# Patient Record
Sex: FEMALE | Race: WHITE | Employment: FULL TIME
[De-identification: names, ages, dates, MRNs, and addresses within clinical notes are randomized per-mention and may not be internally consistent; named-entity substitution may affect disease eponyms.]

---

## 2017-01-03 ENCOUNTER — SURGERY (OUTPATIENT)
Age: 47
End: 2017-01-03

## 2017-01-03 ENCOUNTER — ANESTHESIA EVENT (OUTPATIENT)
Dept: SURGERY | Facility: HOSPITAL | Age: 47
End: 2017-01-03
Payer: COMMERCIAL

## 2017-01-03 ENCOUNTER — ANESTHESIA (OUTPATIENT)
Dept: SURGERY | Facility: HOSPITAL | Age: 47
End: 2017-01-03
Payer: COMMERCIAL

## 2017-01-03 ENCOUNTER — HOSPITAL ENCOUNTER (OUTPATIENT)
Facility: HOSPITAL | Age: 47
Setting detail: OBSERVATION
Discharge: HOME | End: 2017-01-04
Attending: SURGERY | Admitting: SURGERY
Payer: COMMERCIAL

## 2017-01-03 DIAGNOSIS — K35.30 ACUTE APPENDICITIS WITH LOCALIZED PERITONITIS: Primary | ICD-10-CM

## 2017-01-03 PROBLEM — K37 APPENDICITIS: Status: ACTIVE | Noted: 2017-01-03

## 2017-01-03 PROCEDURE — 88304 TISSUE EXAM BY PATHOLOGIST: CPT | Performed by: SURGERY

## 2017-01-03 PROCEDURE — 0DTJ4ZZ RESECTION OF APPENDIX, PERCUTANEOUS ENDOSCOPIC APPROACH: ICD-10-PCS | Performed by: SURGERY

## 2017-01-03 RX ORDER — MIDAZOLAM HYDROCHLORIDE 1 MG/ML
1 INJECTION INTRAMUSCULAR; INTRAVENOUS EVERY 5 MIN PRN
Status: DISCONTINUED | OUTPATIENT
Start: 2017-01-03 | End: 2017-01-03 | Stop reason: HOSPADM

## 2017-01-03 RX ORDER — MELATONIN
325
Status: DISCONTINUED | OUTPATIENT
Start: 2017-01-04 | End: 2017-01-04

## 2017-01-03 RX ORDER — ONDANSETRON 2 MG/ML
INJECTION INTRAMUSCULAR; INTRAVENOUS
Status: DISCONTINUED | OUTPATIENT
Start: 2017-01-03 | End: 2017-01-03 | Stop reason: HOSPADM

## 2017-01-03 RX ORDER — HYDROCODONE BITARTRATE AND ACETAMINOPHEN 5; 325 MG/1; MG/1
1 TABLET ORAL AS NEEDED
Status: DISCONTINUED | OUTPATIENT
Start: 2017-01-03 | End: 2017-01-03 | Stop reason: HOSPADM

## 2017-01-03 RX ORDER — LIDOCAINE HYDROCHLORIDE AND EPINEPHRINE 10; 10 MG/ML; UG/ML
INJECTION, SOLUTION INFILTRATION; PERINEURAL AS NEEDED
Status: DISCONTINUED | OUTPATIENT
Start: 2017-01-03 | End: 2017-01-03 | Stop reason: HOSPADM

## 2017-01-03 RX ORDER — HYDROCODONE BITARTRATE AND ACETAMINOPHEN 5; 325 MG/1; MG/1
1 TABLET ORAL EVERY 4 HOURS PRN
Status: DISCONTINUED | OUTPATIENT
Start: 2017-01-03 | End: 2017-01-04

## 2017-01-03 RX ORDER — SODIUM CHLORIDE, SODIUM LACTATE, POTASSIUM CHLORIDE, CALCIUM CHLORIDE 600; 310; 30; 20 MG/100ML; MG/100ML; MG/100ML; MG/100ML
INJECTION, SOLUTION INTRAVENOUS CONTINUOUS
Status: DISCONTINUED | OUTPATIENT
Start: 2017-01-03 | End: 2017-01-04

## 2017-01-03 RX ORDER — HYDROCODONE BITARTRATE AND ACETAMINOPHEN 5; 325 MG/1; MG/1
2 TABLET ORAL EVERY 4 HOURS PRN
Status: DISCONTINUED | OUTPATIENT
Start: 2017-01-03 | End: 2017-01-04

## 2017-01-03 RX ORDER — FLUTICASONE PROPIONATE 50 MCG
2 SPRAY, SUSPENSION (ML) NASAL DAILY
Status: DISCONTINUED | OUTPATIENT
Start: 2017-01-03 | End: 2017-01-04

## 2017-01-03 RX ORDER — POLYETHYLENE GLYCOL 3350 17 G/17G
17 POWDER, FOR SOLUTION ORAL DAILY PRN
Status: DISCONTINUED | OUTPATIENT
Start: 2017-01-03 | End: 2017-01-03

## 2017-01-03 RX ORDER — NALOXONE HYDROCHLORIDE 0.4 MG/ML
80 INJECTION, SOLUTION INTRAMUSCULAR; INTRAVENOUS; SUBCUTANEOUS AS NEEDED
Status: DISCONTINUED | OUTPATIENT
Start: 2017-01-03 | End: 2017-01-03 | Stop reason: HOSPADM

## 2017-01-03 RX ORDER — MERCAPTOPURINE 50 MG/1
75 TABLET ORAL DAILY
COMMUNITY
End: 2018-01-15

## 2017-01-03 RX ORDER — KETOROLAC TROMETHAMINE 15 MG/ML
15 INJECTION, SOLUTION INTRAMUSCULAR; INTRAVENOUS EVERY 6 HOURS PRN
Status: DISCONTINUED | OUTPATIENT
Start: 2017-01-03 | End: 2017-01-04

## 2017-01-03 RX ORDER — HEPARIN SODIUM 5000 [USP'U]/ML
5000 INJECTION, SOLUTION INTRAVENOUS; SUBCUTANEOUS EVERY 12 HOURS
Status: DISCONTINUED | OUTPATIENT
Start: 2017-01-04 | End: 2017-01-04

## 2017-01-03 RX ORDER — METOCLOPRAMIDE HYDROCHLORIDE 5 MG/ML
INJECTION INTRAMUSCULAR; INTRAVENOUS
Status: DISCONTINUED | OUTPATIENT
Start: 2017-01-03 | End: 2017-01-03 | Stop reason: HOSPADM

## 2017-01-03 RX ORDER — POLYETHYLENE GLYCOL 3350 17 G/17G
17 POWDER, FOR SOLUTION ORAL DAILY PRN
Status: DISCONTINUED | OUTPATIENT
Start: 2017-01-03 | End: 2017-01-04

## 2017-01-03 RX ORDER — HYDROMORPHONE HYDROCHLORIDE 1 MG/ML
0.8 INJECTION, SOLUTION INTRAMUSCULAR; INTRAVENOUS; SUBCUTANEOUS EVERY 2 HOUR PRN
Status: DISCONTINUED | OUTPATIENT
Start: 2017-01-03 | End: 2017-01-04

## 2017-01-03 RX ORDER — ZOLPIDEM TARTRATE 5 MG/1
5 TABLET ORAL NIGHTLY PRN
Status: DISCONTINUED | OUTPATIENT
Start: 2017-01-03 | End: 2017-01-04

## 2017-01-03 RX ORDER — SERTRALINE HYDROCHLORIDE 100 MG/1
200 TABLET, FILM COATED ORAL NIGHTLY
Status: DISCONTINUED | OUTPATIENT
Start: 2017-01-04 | End: 2017-01-04

## 2017-01-03 RX ORDER — HYDROMORPHONE HYDROCHLORIDE 1 MG/ML
0.4 INJECTION, SOLUTION INTRAMUSCULAR; INTRAVENOUS; SUBCUTANEOUS EVERY 2 HOUR PRN
Status: DISCONTINUED | OUTPATIENT
Start: 2017-01-03 | End: 2017-01-04

## 2017-01-03 RX ORDER — BUPIVACAINE HYDROCHLORIDE 5 MG/ML
INJECTION, SOLUTION EPIDURAL; INTRACAUDAL AS NEEDED
Status: DISCONTINUED | OUTPATIENT
Start: 2017-01-03 | End: 2017-01-03 | Stop reason: HOSPADM

## 2017-01-03 RX ORDER — CLONAZEPAM 0.5 MG/1
0.5 TABLET ORAL DAILY PRN
COMMUNITY
End: 2017-01-04

## 2017-01-03 RX ORDER — DOCUSATE SODIUM 100 MG/1
100 CAPSULE, LIQUID FILLED ORAL 2 TIMES DAILY
Status: DISCONTINUED | OUTPATIENT
Start: 2017-01-03 | End: 2017-01-04

## 2017-01-03 RX ORDER — HYDROMORPHONE HYDROCHLORIDE 1 MG/ML
0.4 INJECTION, SOLUTION INTRAMUSCULAR; INTRAVENOUS; SUBCUTANEOUS EVERY 5 MIN PRN
Status: DISCONTINUED | OUTPATIENT
Start: 2017-01-03 | End: 2017-01-03 | Stop reason: HOSPADM

## 2017-01-03 RX ORDER — ONDANSETRON 2 MG/ML
4 INJECTION INTRAMUSCULAR; INTRAVENOUS AS NEEDED
Status: DISCONTINUED | OUTPATIENT
Start: 2017-01-03 | End: 2017-01-03 | Stop reason: HOSPADM

## 2017-01-03 RX ORDER — HYDROCODONE BITARTRATE AND ACETAMINOPHEN 5; 325 MG/1; MG/1
2 TABLET ORAL AS NEEDED
Status: DISCONTINUED | OUTPATIENT
Start: 2017-01-03 | End: 2017-01-03 | Stop reason: HOSPADM

## 2017-01-03 RX ORDER — BACITRACIN 50000 [USP'U]/1
INJECTION, POWDER, LYOPHILIZED, FOR SOLUTION INTRAMUSCULAR AS NEEDED
Status: DISCONTINUED | OUTPATIENT
Start: 2017-01-03 | End: 2017-01-03 | Stop reason: HOSPADM

## 2017-01-03 RX ORDER — HYDROMORPHONE HYDROCHLORIDE 1 MG/ML
0.4 INJECTION, SOLUTION INTRAMUSCULAR; INTRAVENOUS; SUBCUTANEOUS EVERY 2 HOUR PRN
Status: DISCONTINUED | OUTPATIENT
Start: 2017-01-03 | End: 2017-01-03

## 2017-01-03 RX ORDER — ONDANSETRON 2 MG/ML
4 INJECTION INTRAMUSCULAR; INTRAVENOUS EVERY 6 HOURS PRN
Status: DISCONTINUED | OUTPATIENT
Start: 2017-01-03 | End: 2017-01-04

## 2017-01-03 RX ORDER — KETOROLAC TROMETHAMINE 30 MG/ML
30 INJECTION, SOLUTION INTRAMUSCULAR; INTRAVENOUS EVERY 6 HOURS PRN
Status: DISCONTINUED | OUTPATIENT
Start: 2017-01-03 | End: 2017-01-04

## 2017-01-03 RX ORDER — MERCAPTOPURINE 50 MG/1
75 TABLET ORAL DAILY
Status: DISCONTINUED | OUTPATIENT
Start: 2017-01-04 | End: 2017-01-04

## 2017-01-03 RX ORDER — MEPERIDINE HYDROCHLORIDE 25 MG/ML
12.5 INJECTION INTRAMUSCULAR; INTRAVENOUS; SUBCUTANEOUS AS NEEDED
Status: DISCONTINUED | OUTPATIENT
Start: 2017-01-03 | End: 2017-01-03 | Stop reason: HOSPADM

## 2017-01-03 RX ORDER — OMEPRAZOLE 20 MG/1
40 CAPSULE, DELAYED RELEASE ORAL
COMMUNITY
End: 2017-08-08 | Stop reason: DRUGHIGH

## 2017-01-03 RX ORDER — GARLIC EXTRACT 500 MG
1 CAPSULE ORAL DAILY
Status: DISCONTINUED | OUTPATIENT
Start: 2017-01-04 | End: 2017-01-04

## 2017-01-03 RX ORDER — DEXAMETHASONE SODIUM PHOSPHATE 4 MG/ML
VIAL (ML) INJECTION
Status: DISCONTINUED | OUTPATIENT
Start: 2017-01-03 | End: 2017-01-03 | Stop reason: HOSPADM

## 2017-01-03 RX ORDER — HYDROMORPHONE HYDROCHLORIDE 1 MG/ML
0.8 INJECTION, SOLUTION INTRAMUSCULAR; INTRAVENOUS; SUBCUTANEOUS EVERY 2 HOUR PRN
Status: DISCONTINUED | OUTPATIENT
Start: 2017-01-03 | End: 2017-01-03

## 2017-01-03 RX ORDER — PANTOPRAZOLE SODIUM 20 MG/1
20 TABLET, DELAYED RELEASE ORAL
Status: DISCONTINUED | OUTPATIENT
Start: 2017-01-04 | End: 2017-01-04

## 2017-01-03 RX ORDER — FLUTICASONE PROPIONATE 50 MCG
2 SPRAY, SUSPENSION (ML) NASAL DAILY PRN
COMMUNITY
End: 2017-01-23

## 2017-01-03 RX ORDER — HYDROMORPHONE HYDROCHLORIDE 1 MG/ML
INJECTION, SOLUTION INTRAMUSCULAR; INTRAVENOUS; SUBCUTANEOUS
Status: COMPLETED
Start: 2017-01-03 | End: 2017-01-03

## 2017-01-03 RX ORDER — DEXTROSE, SODIUM CHLORIDE, SODIUM LACTATE, POTASSIUM CHLORIDE, AND CALCIUM CHLORIDE 5; .6; .31; .03; .02 G/100ML; G/100ML; G/100ML; G/100ML; G/100ML
INJECTION, SOLUTION INTRAVENOUS CONTINUOUS
Status: DISCONTINUED | OUTPATIENT
Start: 2017-01-03 | End: 2017-01-04

## 2017-01-03 RX ORDER — HYDROMORPHONE HYDROCHLORIDE 1 MG/ML
1.2 INJECTION, SOLUTION INTRAMUSCULAR; INTRAVENOUS; SUBCUTANEOUS EVERY 2 HOUR PRN
Status: DISCONTINUED | OUTPATIENT
Start: 2017-01-03 | End: 2017-01-04

## 2017-01-03 RX ORDER — SODIUM PHOSPHATE, DIBASIC AND SODIUM PHOSPHATE, MONOBASIC 7; 19 G/133ML; G/133ML
1 ENEMA RECTAL ONCE AS NEEDED
Status: ACTIVE | OUTPATIENT
Start: 2017-01-03 | End: 2017-01-03

## 2017-01-03 RX ORDER — SODIUM CHLORIDE 9 MG/ML
INJECTION, SOLUTION INTRAVENOUS CONTINUOUS
Status: DISCONTINUED | OUTPATIENT
Start: 2017-01-03 | End: 2017-01-04

## 2017-01-03 RX ORDER — BISACODYL 10 MG
10 SUPPOSITORY, RECTAL RECTAL
Status: DISCONTINUED | OUTPATIENT
Start: 2017-01-03 | End: 2017-01-04

## 2017-01-03 RX ORDER — HYDROMORPHONE HYDROCHLORIDE 1 MG/ML
0.2 INJECTION, SOLUTION INTRAMUSCULAR; INTRAVENOUS; SUBCUTANEOUS EVERY 2 HOUR PRN
Status: DISCONTINUED | OUTPATIENT
Start: 2017-01-03 | End: 2017-01-03

## 2017-01-03 RX ORDER — MIDAZOLAM HYDROCHLORIDE 1 MG/ML
INJECTION INTRAMUSCULAR; INTRAVENOUS
Status: COMPLETED
Start: 2017-01-03 | End: 2017-01-03

## 2017-01-03 RX ORDER — CLONAZEPAM 0.5 MG/1
0.5 TABLET ORAL DAILY PRN
Status: DISCONTINUED | OUTPATIENT
Start: 2017-01-03 | End: 2017-01-04

## 2017-01-03 NOTE — ANESTHESIA PREPROCEDURE EVALUATION
PRE-OP EVALUATION    Patient Name: Erling Gilford    Pre-op Diagnosis: acute appendicitis    Procedure(s):  Lap aline    Surgeon(s) and Role:     * Sonia Miner MD - Primary    Pre-op vitals reviewed.   Temp: 98.4 °F (36.9 °C)  Pulse: 69  Resp: 18  BP: 11 COLONOSCOPY  6/3/2014    Comment Procedure: COLONOSCOPY;  Surgeon: Edwar Fitzpatrick MD;  Location: [facility] ENDOSCOPY    GASTRO - [facility]  6/14    Comment minimal gastritis    COLONOSCOPY  7/21/2014    Comment Procedure: COLONOSCOPY;  Surgeon: Terell Arambula

## 2017-01-04 VITALS
SYSTOLIC BLOOD PRESSURE: 100 MMHG | RESPIRATION RATE: 16 BRPM | OXYGEN SATURATION: 100 % | HEART RATE: 53 BPM | TEMPERATURE: 98 F | DIASTOLIC BLOOD PRESSURE: 69 MMHG

## 2017-01-04 LAB
ALBUMIN SERPL-MCNC: 3.3 G/DL (ref 3.5–4.8)
ALP LIVER SERPL-CCNC: 74 U/L (ref 39–100)
ALT SERPL-CCNC: 69 U/L (ref 14–54)
AST SERPL-CCNC: 54 U/L (ref 15–41)
BASOPHILS # BLD AUTO: 0.06 X10(3) UL (ref 0–0.1)
BASOPHILS NFR BLD AUTO: 0.4 %
BILIRUB DIRECT SERPL-MCNC: 0.1 MG/DL (ref 0.1–0.5)
BILIRUB SERPL-MCNC: 0.6 MG/DL (ref 0.1–2)
BUN BLD-MCNC: 8 MG/DL (ref 8–20)
CALCIUM BLD-MCNC: 9.2 MG/DL (ref 8.3–10.3)
CHLORIDE: 103 MMOL/L (ref 101–111)
CO2: 26 MMOL/L (ref 22–32)
CREAT BLD-MCNC: 0.66 MG/DL (ref 0.55–1.02)
EOSINOPHIL # BLD AUTO: 0 X10(3) UL (ref 0–0.3)
EOSINOPHIL NFR BLD AUTO: 0 %
ERYTHROCYTE [DISTWIDTH] IN BLOOD BY AUTOMATED COUNT: 13.7 % (ref 11.5–16)
GLUCOSE BLD-MCNC: 109 MG/DL (ref 70–99)
HAV IGM SER QL: 2.2 MG/DL (ref 1.7–3)
HCT VFR BLD AUTO: 35.9 % (ref 34–50)
HGB BLD-MCNC: 11.4 G/DL (ref 12–16)
IMMATURE GRANULOCYTE COUNT: 0.09 X10(3) UL (ref 0–1)
IMMATURE GRANULOCYTE RATIO %: 0.6 %
LYMPHOCYTES # BLD AUTO: 1.75 X10(3) UL (ref 0.9–4)
LYMPHOCYTES NFR BLD AUTO: 11.3 %
M PROTEIN MFR SERPL ELPH: 7.6 G/DL (ref 6.1–8.3)
MCH RBC QN AUTO: 30.8 PG (ref 27–33.2)
MCHC RBC AUTO-ENTMCNC: 31.8 G/DL (ref 31–37)
MCV RBC AUTO: 97 FL (ref 81–100)
MONOCYTES # BLD AUTO: 1.43 X10(3) UL (ref 0.1–0.6)
MONOCYTES NFR BLD AUTO: 9.3 %
NEUTROPHIL ABS PRELIM: 12.1 X10 (3) UL (ref 1.3–6.7)
NEUTROPHILS # BLD AUTO: 12.1 X10(3) UL (ref 1.3–6.7)
NEUTROPHILS NFR BLD AUTO: 78.4 %
PLATELET # BLD AUTO: 356 10(3)UL (ref 150–450)
POTASSIUM SERPL-SCNC: 3.8 MMOL/L (ref 3.6–5.1)
RBC # BLD AUTO: 3.7 X10(6)UL (ref 3.8–5.1)
RED CELL DISTRIBUTION WIDTH-SD: 49.1 FL (ref 35.1–46.3)
SODIUM SERPL-SCNC: 136 MMOL/L (ref 136–144)
WBC # BLD AUTO: 15.4 X10(3) UL (ref 4–13)

## 2017-01-04 PROCEDURE — 85025 COMPLETE CBC W/AUTO DIFF WBC: CPT | Performed by: HOSPITALIST

## 2017-01-04 PROCEDURE — 80048 BASIC METABOLIC PNL TOTAL CA: CPT | Performed by: HOSPITALIST

## 2017-01-04 PROCEDURE — 83735 ASSAY OF MAGNESIUM: CPT | Performed by: HOSPITALIST

## 2017-01-04 PROCEDURE — 80076 HEPATIC FUNCTION PANEL: CPT | Performed by: HOSPITALIST

## 2017-01-04 RX ORDER — AMOXICILLIN AND CLAVULANATE POTASSIUM 875; 125 MG/1; MG/1
1 TABLET, FILM COATED ORAL 2 TIMES DAILY
Qty: 14 TABLET | Refills: 0 | Status: SHIPPED | OUTPATIENT
Start: 2017-01-04 | End: 2017-01-11

## 2017-01-04 RX ORDER — HYDROCODONE BITARTRATE AND ACETAMINOPHEN 5; 325 MG/1; MG/1
1 TABLET ORAL EVERY 4 HOURS PRN
Qty: 20 TABLET | Refills: 0 | Status: SHIPPED | OUTPATIENT
Start: 2017-01-04 | End: 2017-01-14

## 2017-01-04 NOTE — PROGRESS NOTES
Patient received from PACU via cart by transport team. Patient in stable condition. Patient resting comfortably in bed. Family at bedside. Will continue to monitor.

## 2017-01-04 NOTE — OPERATIVE REPORT
OPERATIVE REPORT   PREOPERATIVE DIAGNOSIS: Acute appendicitis. POSTOPERATIVE DIAGNOSIS: Acute appendicitis. PROCEDURE PERFORMED: Laparoscopic appendectomy.    DESCRIPTION OF PROCEDURE: Patient was brought into the operating room, placed on the operating

## 2017-01-04 NOTE — H&P
[facility] Hospitalist H&P       CC: direct admission for acute appendicitis    PCP: Judith Schaeffer MD    History of Present Illness: Pt is a 52 yo chronic migraine, ulcerative colitis, GERD, major depression, anxiety, who is directly admitted from COLONOSCOPY;  Surgeon: Malena Martin MD;  Location: [facility] ENDOSCOPY    COLONOSCOPY & POLYPECTOMY  6/3/16    Comment two polyps removed, chronic colitis; ASC    COLONOSCOPY,REMV LUISITO,SNARE N/A 6/3/2016    Comment Procedure: COLONOSCOPY, POSSIBLE BIOPSY, Neck: Supple, symmetrical, trachea midline, no thyromegaly   Lungs:   Clear to auscultation bilaterally. Normal effort   Chest wall:  No tenderness or deformity.    Heart:  Regular rate and rhythm, S1, S2 normal, no murmur, rub or gallop appreciated, pancreas is grossly normal in size and homogeneous. There is no pancreatic ductal dilatation. SPLEEN: The spleen is grossly normal in size and homogeneous without focal solid or cystic lesions.  ADRENAL GLANDS: The adrenal glands are normal in size and shap issues. When able to take po, continue home meds    **PPx-scds    Outpatient records or previous hospital records reviewed. Further recommendations pending patient's clinical course.   [facility] hospitalist to continue to follow patient while in house    Myra Villatoro

## 2017-01-04 NOTE — PLAN OF CARE
Written and verbal discharge instructions given to patient and  verbalize understanding. IV discontinued in RT , angio-cath tip intact, site free from redness, swelling, or drainage, patient denies pain at site. Dressing applied.   Prescription given for Na

## 2017-01-04 NOTE — PROGRESS NOTES
[facility]  Progress Note    Nicole Odell Patient Status:  Inpatient    1970 MRN [id number]   [facility] 3NW-A Attending Bo Sharp MD   Hosp Day # 1 PCP Salvador Holley MD     Subjective:    Patient POD # 1 laparos shoulders     Ulcerative colitis (HCC)     Acute colitis     Left lumbar radiculitis     Acute chest pain     Primary osteoarthritis of both knees     Appendicitis          Impression:     46 y/p POD # 1 laparoscopic appendectomy, progressing as expected

## 2017-01-04 NOTE — PLAN OF CARE
DISCHARGE PLANNING    • Discharge to home or other facility with appropriate resources Completed        PAIN - ADULT    • Verbalizes/displays adequate comfort level or patient's stated pain goal Completed        Patient/Family Goals    • Patient/Family Gene

## 2017-01-04 NOTE — PLAN OF CARE
NURSING ADMISSION NOTE      Patient admitted via direct admission. Oriented to room. Safety precautions initiated. Bed in low position. Call light in reach.

## 2017-01-04 NOTE — PROGRESS NOTES
[facility] Hospitalist Progress Note     PCP: Rios George MD    CC:  Follow up    SUBJECTIVE:  Pt walking halls. S/p lap appendectomy overnight. Says has moderate sharp pain under R ribs and radiates to R shoulder.   Says worse at certain positions • Pantoprazole Sodium  20 mg Oral QAM AC   • Sertraline HCl  200 mg Oral Nightly   • docusate sodium  100 mg Oral BID   • Heparin Sodium (Porcine)  5,000 Units Subcutaneous Q12H   • piperacillin-tazobactam  3.375 g Intravenous Q8H     • sodium chloride

## 2017-01-04 NOTE — CONSULTS
Patient Name: Criss Bailey  MRN: [id number]  CSN: [id number]  YOB: 1970    Diagnosis: acute appendictis          Prescriptions prior to admission:  ClonazePAM 0.5 MG Oral Tab Take 0.5 mg by mouth daily as needed for Anxiety.  Disp:  Rfl: Facility-Administered Medications:  CefOXitin Sodium (MEFOXIN) 2 g in sodium chloride 0.9 % 100 mL IVPB-minibag 2 g Intravenous Once   0.9%  NaCl infusion  Intravenous Continuous   HYDROmorphone HCl PF (DILAUDID) 1 MG/ML injection 0.2 mg 0.2 mg Intravenous COLONOSCOPY,BIOPSY  1/5/2011    Comment Performed by Dong Dodd at [place] [address]  2/18/2013    Comment Procedure: ESOPHAGOGASTRODUODENOSCOPY, COLONOSCOPY, POSSIBLE BIOPSY, POSSIBLE POLYPECTOMY 94945,77745;  Surgeon: Kenya Cunha oz/week    2-3 Standard drinks or equivalent per week            SYSTEM Check if Review is Normal Check if Physical Exam is Normal If not normal, please explain:   HEENT [ x] x    NECK & BACK x x    HEART x x    LUNGS x x    ABDOMEN [ ] [ ] Tender RLQ   UR

## 2017-01-04 NOTE — PLAN OF CARE
DISCHARGE PLANNING    • Discharge to home or other facility with appropriate resources Progressing        PAIN - ADULT    • Verbalizes/displays adequate comfort level or patient's stated pain goal Progressing        Patient/Family Goals    • Patient/Family

## 2017-01-04 NOTE — ANESTHESIA POSTPROCEDURE EVALUATION
[address] Patient Status:  Inpatient   Age/Gender 51year old female MRN [id number]   Location [address] [facility] Attending Katya Spangler, [address] [facility] Se Day # 0 PCP Ronit Zapata MD       Anesthesia Post

## 2017-01-04 NOTE — PAYOR COMM NOTE
Attending Physician: Hakeem Jimenez MD    Review Type: ADMISSION   Reviewer: Yumiko Carl       Date: January 4, 2017 - 1:25 PM  Payor: [address] [place] [address]   Authorization Number: [id number]  Admit date: 1/3/2017  5:31 PM        History of Present Illness: Pt is a ABDOMINAL AORTA: The aorta is normal in course and caliber. MESENTERY/RETROPERITONEUM: There is no evidence of mesenteric, retroperitoneal or inguinal adenopathy. BOWEL: The appendix is thickened and dilated, measuring 1.5 cm in size.  There is significant Route User    1/4/2017 5230 Given 100 mg Oral Avila Kumar, DAISHA      ferrous sulfate EC tab 325 mg     Date Action Dose Route User    1/4/2017 0939 Given 325 mg Oral Avila Kumar, DAISHA      Fluticasone Propionate (FLONASE) Given 20 mL Infiltration Pop Ferrera MD      mercaptopurine (PURINETHOL) tab 75 mg     Date Action Dose Route User    1/4/2017 1144 Given 75 mg Oral Avila Kumar, DAISHA      Metoclopramide HCl (REGLAN) injection     Date Action Dose Rout Notable for the following:     WBC 15.4 (*)     RBC 3.70 (*)     HGB 11.4 (*)     RDW-SD 49.1 (*)     Neutrophil Absolute Prelim 12.10 (*)     Neutrophil Absolute 12.10 (*)     Monocyte Absolute 1.43 (*)     All other components within normal limits   MAGN

## 2017-01-04 NOTE — PROGRESS NOTES
RECEIVED FROM [facility]MM[facility], NO RESPIRATORY DIFFICULTY NOTED, ABD SOFT, DENIES NAUSEA, C/O LOWER ABD DISCOMFORT, DENIES ANY URINARY DIFFICULTY, DENIES ANY FEVER OR CHILLS, DR Umang Flores AND DR AC NOTIFIED OF ADMISSION AND ORDERS RECIEVED

## 2017-07-24 PROCEDURE — 87205 SMEAR GRAM STAIN: CPT | Performed by: OTOLARYNGOLOGY

## 2017-07-24 PROCEDURE — 87070 CULTURE OTHR SPECIMN AEROBIC: CPT | Performed by: OTOLARYNGOLOGY

## 2017-09-22 PROCEDURE — 87070 CULTURE OTHR SPECIMN AEROBIC: CPT | Performed by: OTOLARYNGOLOGY

## 2017-09-22 PROCEDURE — 87205 SMEAR GRAM STAIN: CPT | Performed by: OTOLARYNGOLOGY

## 2018-03-29 PROBLEM — M25.551 RIGHT HIP PAIN: Status: ACTIVE | Noted: 2018-03-29

## 2018-03-29 PROBLEM — M25.552 LEFT HIP PAIN: Status: ACTIVE | Noted: 2018-03-29

## 2018-04-30 PROBLEM — M16.0 PRIMARY OSTEOARTHRITIS OF BOTH HIPS: Status: ACTIVE | Noted: 2018-04-30

## 2018-04-30 PROBLEM — R76.8 POSITIVE ANA (ANTINUCLEAR ANTIBODY): Status: ACTIVE | Noted: 2018-04-30

## 2018-05-25 PROCEDURE — 83516 IMMUNOASSAY NONANTIBODY: CPT | Performed by: INTERNAL MEDICINE

## 2018-05-25 PROCEDURE — 86038 ANTINUCLEAR ANTIBODIES: CPT | Performed by: INTERNAL MEDICINE

## 2018-05-25 PROCEDURE — 36415 COLL VENOUS BLD VENIPUNCTURE: CPT | Performed by: INTERNAL MEDICINE

## 2018-05-25 PROCEDURE — 86225 DNA ANTIBODY NATIVE: CPT | Performed by: INTERNAL MEDICINE

## 2018-07-03 PROCEDURE — 88175 CYTOPATH C/V AUTO FLUID REDO: CPT | Performed by: OBSTETRICS & GYNECOLOGY

## 2018-07-03 PROCEDURE — 87624 HPV HI-RISK TYP POOLED RSLT: CPT | Performed by: OBSTETRICS & GYNECOLOGY

## 2018-09-20 PROBLEM — M25.50 MULTIPLE JOINT PAIN: Status: ACTIVE | Noted: 2018-09-20

## 2018-09-20 PROBLEM — M54.16 LUMBAR RADICULITIS: Status: ACTIVE | Noted: 2018-09-20

## 2018-09-24 PROCEDURE — 88305 TISSUE EXAM BY PATHOLOGIST: CPT | Performed by: INTERNAL MEDICINE

## 2018-12-06 PROBLEM — J02.9 SORE THROAT: Status: ACTIVE | Noted: 2018-12-06

## 2018-12-06 PROBLEM — K14.6 BURNING TONGUE: Status: ACTIVE | Noted: 2018-12-06

## 2018-12-20 ENCOUNTER — HOSPITAL ENCOUNTER (EMERGENCY)
Facility: HOSPITAL | Age: 48
Discharge: HOME | End: 2018-12-21
Attending: EMERGENCY MEDICINE
Payer: COMMERCIAL

## 2018-12-20 ENCOUNTER — APPOINTMENT (OUTPATIENT)
Dept: GENERAL RADIOLOGY | Facility: HOSPITAL | Age: 48
End: 2018-12-20
Attending: EMERGENCY MEDICINE
Payer: COMMERCIAL

## 2018-12-20 ENCOUNTER — APPOINTMENT (OUTPATIENT)
Dept: CT IMAGING | Facility: HOSPITAL | Age: 48
End: 2018-12-20
Attending: EMERGENCY MEDICINE
Payer: COMMERCIAL

## 2018-12-20 DIAGNOSIS — R09.1 PLEURISY: ICD-10-CM

## 2018-12-20 DIAGNOSIS — J18.9 COMMUNITY ACQUIRED PNEUMONIA OF LEFT LOWER LOBE OF LUNG: Primary | ICD-10-CM

## 2018-12-20 PROCEDURE — 84484 ASSAY OF TROPONIN QUANT: CPT | Performed by: EMERGENCY MEDICINE

## 2018-12-20 PROCEDURE — 93010 ELECTROCARDIOGRAM REPORT: CPT

## 2018-12-20 PROCEDURE — 71275 CT ANGIOGRAPHY CHEST: CPT | Performed by: EMERGENCY MEDICINE

## 2018-12-20 PROCEDURE — 85025 COMPLETE CBC W/AUTO DIFF WBC: CPT | Performed by: EMERGENCY MEDICINE

## 2018-12-20 PROCEDURE — 96375 TX/PRO/DX INJ NEW DRUG ADDON: CPT

## 2018-12-20 PROCEDURE — 96374 THER/PROPH/DIAG INJ IV PUSH: CPT

## 2018-12-20 PROCEDURE — 99285 EMERGENCY DEPT VISIT HI MDM: CPT

## 2018-12-20 PROCEDURE — 80053 COMPREHEN METABOLIC PANEL: CPT | Performed by: EMERGENCY MEDICINE

## 2018-12-20 PROCEDURE — 71045 X-RAY EXAM CHEST 1 VIEW: CPT | Performed by: EMERGENCY MEDICINE

## 2018-12-20 PROCEDURE — 93005 ELECTROCARDIOGRAM TRACING: CPT

## 2018-12-20 PROCEDURE — 85378 FIBRIN DEGRADE SEMIQUANT: CPT | Performed by: EMERGENCY MEDICINE

## 2018-12-20 RX ORDER — HYDROMORPHONE HYDROCHLORIDE 1 MG/ML
1 INJECTION, SOLUTION INTRAMUSCULAR; INTRAVENOUS; SUBCUTANEOUS EVERY 30 MIN PRN
Status: DISCONTINUED | OUTPATIENT
Start: 2018-12-20 | End: 2018-12-21

## 2018-12-20 RX ORDER — KETOROLAC TROMETHAMINE 30 MG/ML
30 INJECTION, SOLUTION INTRAMUSCULAR; INTRAVENOUS ONCE
Status: COMPLETED | OUTPATIENT
Start: 2018-12-20 | End: 2018-12-20

## 2018-12-21 VITALS
SYSTOLIC BLOOD PRESSURE: 113 MMHG | BODY MASS INDEX: 39.99 KG/M2 | OXYGEN SATURATION: 94 % | TEMPERATURE: 97 F | DIASTOLIC BLOOD PRESSURE: 73 MMHG | RESPIRATION RATE: 20 BRPM | HEIGHT: 65 IN | HEART RATE: 97 BPM | WEIGHT: 240 LBS

## 2018-12-21 NOTE — ED PROVIDER NOTES
Patient Seen in: [facility] Emergency Department    History   Patient presents with:  Chest Pain Angina (cardiovascular)  Jaw Pain    Stated Complaint: Jaw pain/chest pain/shoulder blade pain - seen x4 in past two weeks for same sy*    HPI    48-yea • COLONOSCOPY N/A 6/3/2014    Performed by Tyra High MD at [facility] ENDOSCOPY   • COLONOSCOPY & POLYPECTOMY  6/3/16    two polyps removed, chronic colitis; ASC   • COLONOSCOPY, POSSIBLE BIOPSY, POSSIBLE POLYPECTOMY 81103 N/A 6/3/2016    Performed by LMP 12/11/2018   SpO2 94%   BMI 39.94 kg/m²         Physical Exam  General: This a 26-year-old female who is writhing around on the bed complaining of chest discomfort. She then begins hyperventilating and then holds her breath.   She then begins rocking i results are used as part of the total clinical  evaluation of the patient. 1st Trimester:  0.05-0.95 ug/mL (FEU)     2nd Trimester:  0.32-1.29 ug/mL (FEU)    3rd Trimester:  0.13-1.70 ug/mL (FEU)    In pregnant females, refer to the chart above.   No didier ([id number])    Result Date: 12/20/2018  CONCLUSION:  1. No acute pulmonary embolus. 2.  Left lower lobe infiltrates suggestive of pneumonia. 3.  Cardiomegaly with pericardial effusion.  4.  Prominent mediastinal, supraclavicular, and hilar lymph nodes like outpatient. Take ibuprofen and Tylenol for pain control. Return if symptoms worsen. She was discharged in good condition.             Disposition and Plan     Clinical Impression:  Community acquired pneumonia of left lower lobe of lung ([facility] [address]  (primary e

## 2018-12-21 NOTE — ED INITIAL ASSESSMENT (HPI)
Pt c/o jaw pain that radiates into chest and shoulder blades that started around 1:30/2pm. Reports has been seen four times in the past two weeks - reports saw psychologist thinking that it was a panic attack.  Reports her PCP put her on norco and that help

## 2018-12-29 ENCOUNTER — HOSPITAL ENCOUNTER (INPATIENT)
Facility: HOSPITAL | Age: 48
LOS: 2 days | Discharge: HOME | DRG: 315 | End: 2018-12-31
Attending: EMERGENCY MEDICINE | Admitting: HOSPITALIST
Payer: COMMERCIAL

## 2018-12-29 ENCOUNTER — APPOINTMENT (OUTPATIENT)
Dept: GENERAL RADIOLOGY | Facility: HOSPITAL | Age: 48
DRG: 315 | End: 2018-12-29
Attending: EMERGENCY MEDICINE
Payer: COMMERCIAL

## 2018-12-29 DIAGNOSIS — J18.9 COMMUNITY ACQUIRED PNEUMONIA OF LEFT LOWER LOBE OF LUNG: Primary | ICD-10-CM

## 2018-12-29 PROBLEM — R79.89 AZOTEMIA: Status: ACTIVE | Noted: 2018-12-29

## 2018-12-29 PROBLEM — D64.9 ANEMIA: Status: ACTIVE | Noted: 2018-12-29

## 2018-12-29 LAB
ADENOVIRUS PCR:: NEGATIVE
ALBUMIN SERPL-MCNC: 2.8 G/DL (ref 3.1–4.5)
ALBUMIN/GLOB SERPL: 0.6 {RATIO} (ref 1–2)
ALP LIVER SERPL-CCNC: 88 U/L (ref 39–100)
ALT SERPL-CCNC: 28 U/L (ref 14–54)
ANION GAP SERPL CALC-SCNC: 7 MMOL/L (ref 0–18)
AST SERPL-CCNC: 19 U/L (ref 15–41)
ATRIAL RATE: 74 BPM
B PERT DNA SPEC QL NAA+PROBE: NEGATIVE
BASOPHILS # BLD AUTO: 0.05 X10(3) UL (ref 0–0.1)
BASOPHILS NFR BLD AUTO: 0.4 %
BILIRUB SERPL-MCNC: 0.3 MG/DL (ref 0.1–2)
BNP SERPL-MCNC: 304 PG/ML (ref 2–99)
BUN BLD-MCNC: 12 MG/DL (ref 8–20)
BUN/CREAT SERPL: 21.1 (ref 10–20)
C PNEUM DNA SPEC QL NAA+PROBE: NEGATIVE
CALCIUM BLD-MCNC: 8.8 MG/DL (ref 8.3–10.3)
CHLORIDE SERPL-SCNC: 106 MMOL/L (ref 101–111)
CO2 SERPL-SCNC: 26 MMOL/L (ref 22–32)
CORONAVIRUS 229E PCR:: NEGATIVE
CORONAVIRUS HKU1 PCR:: NEGATIVE
CORONAVIRUS NL63 PCR:: NEGATIVE
CORONAVIRUS OC43 PCR:: NEGATIVE
CREAT BLD-MCNC: 0.57 MG/DL (ref 0.55–1.02)
EOSINOPHIL # BLD AUTO: 0.41 X10(3) UL (ref 0–0.3)
EOSINOPHIL NFR BLD AUTO: 3.3 %
ERYTHROCYTE [DISTWIDTH] IN BLOOD BY AUTOMATED COUNT: 13.6 % (ref 11.5–16)
FLUAV RNA SPEC QL NAA+PROBE: NEGATIVE
FLUBV RNA SPEC QL NAA+PROBE: NEGATIVE
GLOBULIN PLAS-MCNC: 4.9 G/DL (ref 2.8–4.4)
GLUCOSE BLD-MCNC: 108 MG/DL (ref 70–99)
HCT VFR BLD AUTO: 33.3 % (ref 34–50)
HGB BLD-MCNC: 10.6 G/DL (ref 12–16)
IMMATURE GRANULOCYTE COUNT: 0.1 X10(3) UL (ref 0–1)
IMMATURE GRANULOCYTE RATIO %: 0.8 %
IRON SATURATION: 7 % (ref 15–50)
IRON: 18 UG/DL (ref 28–170)
LYMPHOCYTES # BLD AUTO: 2.18 X10(3) UL (ref 0.9–4)
LYMPHOCYTES NFR BLD AUTO: 17.4 %
M PROTEIN MFR SERPL ELPH: 7.7 G/DL (ref 6.4–8.2)
MCH RBC QN AUTO: 29.4 PG (ref 27–33.2)
MCHC RBC AUTO-ENTMCNC: 31.8 G/DL (ref 31–37)
MCV RBC AUTO: 92.5 FL (ref 81–100)
METAPNEUMOVIRUS PCR:: NEGATIVE
MONOCYTES # BLD AUTO: 1.41 X10(3) UL (ref 0.1–1)
MONOCYTES NFR BLD AUTO: 11.3 %
MYCOPLASMA PNEUMONIA PCR:: NEGATIVE
NEUTROPHIL ABS PRELIM: 8.37 X10 (3) UL (ref 1.3–6.7)
NEUTROPHILS # BLD AUTO: 8.37 X10(3) UL (ref 1.3–6.7)
NEUTROPHILS NFR BLD AUTO: 66.8 %
OSMOLALITY SERPL CALC.SUM OF ELEC: 288 MOSM/KG (ref 275–295)
P AXIS: 40 DEGREES
P-R INTERVAL: 156 MS
PARAINFLUENZA 1 PCR:: NEGATIVE
PARAINFLUENZA 2 PCR:: NEGATIVE
PARAINFLUENZA 3 PCR:: NEGATIVE
PARAINFLUENZA 4 PCR:: NEGATIVE
PLATELET # BLD AUTO: 598 10(3)UL (ref 150–450)
POTASSIUM SERPL-SCNC: 3.6 MMOL/L (ref 3.6–5.1)
PROCALCITONIN SERPL-MCNC: <0.11 NG/ML
Q-T INTERVAL: 510 MS
QRS DURATION: 86 MS
QTC CALCULATION (BEZET): 566 MS
R AXIS: 50 DEGREES
RBC # BLD AUTO: 3.6 X10(6)UL (ref 3.8–5.1)
RED CELL DISTRIBUTION WIDTH-SD: 46.5 FL (ref 35.1–46.3)
RHINOVIRUS/ENTERO PCR:: NEGATIVE
RSV RNA SPEC QL NAA+PROBE: NEGATIVE
SED RATE-ML: 87 MM/HR (ref 0–25)
SODIUM SERPL-SCNC: 139 MMOL/L (ref 136–144)
T AXIS: 51 DEGREES
TOTAL IRON BINDING CAPACITY: 256 UG/DL (ref 240–450)
TRANSFERRIN SERPL-MCNC: 172 MG/DL (ref 200–360)
TROPONIN I SERPL-MCNC: <0.046 NG/ML (ref ?–0.05)
TSI SER-ACNC: 2.16 MIU/ML (ref 0.35–5.5)
VENTRICULAR RATE: 74 BPM
WBC # BLD AUTO: 12.5 X10(3) UL (ref 4–13)

## 2018-12-29 PROCEDURE — 87040 BLOOD CULTURE FOR BACTERIA: CPT | Performed by: EMERGENCY MEDICINE

## 2018-12-29 PROCEDURE — 85652 RBC SED RATE AUTOMATED: CPT | Performed by: INTERNAL MEDICINE

## 2018-12-29 PROCEDURE — 96365 THER/PROPH/DIAG IV INF INIT: CPT

## 2018-12-29 PROCEDURE — 94660 CPAP INITIATION&MGMT: CPT

## 2018-12-29 PROCEDURE — 87999 UNLISTED MICROBIOLOGY PX: CPT

## 2018-12-29 PROCEDURE — 86256 FLUORESCENT ANTIBODY TITER: CPT | Performed by: INTERNAL MEDICINE

## 2018-12-29 PROCEDURE — 36415 COLL VENOUS BLD VENIPUNCTURE: CPT

## 2018-12-29 PROCEDURE — 71046 X-RAY EXAM CHEST 2 VIEWS: CPT | Performed by: EMERGENCY MEDICINE

## 2018-12-29 PROCEDURE — 93005 ELECTROCARDIOGRAM TRACING: CPT

## 2018-12-29 PROCEDURE — 83516 IMMUNOASSAY NONANTIBODY: CPT | Performed by: INTERNAL MEDICINE

## 2018-12-29 PROCEDURE — 87486 CHLMYD PNEUM DNA AMP PROBE: CPT | Performed by: EMERGENCY MEDICINE

## 2018-12-29 PROCEDURE — 85025 COMPLETE CBC W/AUTO DIFF WBC: CPT | Performed by: EMERGENCY MEDICINE

## 2018-12-29 PROCEDURE — 99285 EMERGENCY DEPT VISIT HI MDM: CPT

## 2018-12-29 PROCEDURE — 84484 ASSAY OF TROPONIN QUANT: CPT | Performed by: HOSPITALIST

## 2018-12-29 PROCEDURE — 83880 ASSAY OF NATRIURETIC PEPTIDE: CPT | Performed by: HOSPITALIST

## 2018-12-29 PROCEDURE — 86431 RHEUMATOID FACTOR QUANT: CPT | Performed by: INTERNAL MEDICINE

## 2018-12-29 PROCEDURE — 86038 ANTINUCLEAR ANTIBODIES: CPT | Performed by: INTERNAL MEDICINE

## 2018-12-29 PROCEDURE — 93010 ELECTROCARDIOGRAM REPORT: CPT

## 2018-12-29 PROCEDURE — 87798 DETECT AGENT NOS DNA AMP: CPT | Performed by: EMERGENCY MEDICINE

## 2018-12-29 PROCEDURE — 86141 C-REACTIVE PROTEIN HS: CPT | Performed by: INTERNAL MEDICINE

## 2018-12-29 PROCEDURE — 86255 FLUORESCENT ANTIBODY SCREEN: CPT | Performed by: INTERNAL MEDICINE

## 2018-12-29 PROCEDURE — 80053 COMPREHEN METABOLIC PANEL: CPT | Performed by: EMERGENCY MEDICINE

## 2018-12-29 PROCEDURE — 83550 IRON BINDING TEST: CPT | Performed by: HOSPITALIST

## 2018-12-29 PROCEDURE — 84443 ASSAY THYROID STIM HORMONE: CPT | Performed by: HOSPITALIST

## 2018-12-29 PROCEDURE — 96375 TX/PRO/DX INJ NEW DRUG ADDON: CPT

## 2018-12-29 PROCEDURE — 83876 ASSAY MYELOPEROXIDASE: CPT | Performed by: INTERNAL MEDICINE

## 2018-12-29 PROCEDURE — 87581 M.PNEUMON DNA AMP PROBE: CPT | Performed by: EMERGENCY MEDICINE

## 2018-12-29 PROCEDURE — 87633 RESP VIRUS 12-25 TARGETS: CPT | Performed by: EMERGENCY MEDICINE

## 2018-12-29 PROCEDURE — 83540 ASSAY OF IRON: CPT | Performed by: HOSPITALIST

## 2018-12-29 PROCEDURE — 84145 PROCALCITONIN (PCT): CPT | Performed by: HOSPITALIST

## 2018-12-29 RX ORDER — MORPHINE SULFATE 4 MG/ML
4 INJECTION, SOLUTION INTRAMUSCULAR; INTRAVENOUS ONCE
Status: COMPLETED | OUTPATIENT
Start: 2018-12-29 | End: 2018-12-29

## 2018-12-29 RX ORDER — TRAMADOL HYDROCHLORIDE 50 MG/1
50 TABLET ORAL EVERY 6 HOURS PRN
Status: DISCONTINUED | OUTPATIENT
Start: 2018-12-29 | End: 2018-12-31

## 2018-12-29 RX ORDER — CLONAZEPAM 1 MG/1
1 TABLET ORAL 2 TIMES DAILY PRN
Status: DISCONTINUED | OUTPATIENT
Start: 2018-12-29 | End: 2018-12-31

## 2018-12-29 RX ORDER — KETOROLAC TROMETHAMINE 30 MG/ML
30 INJECTION, SOLUTION INTRAMUSCULAR; INTRAVENOUS EVERY 6 HOURS PRN
Status: DISCONTINUED | OUTPATIENT
Start: 2018-12-29 | End: 2018-12-29

## 2018-12-29 RX ORDER — HEPARIN SODIUM 5000 [USP'U]/ML
5000 INJECTION, SOLUTION INTRAVENOUS; SUBCUTANEOUS EVERY 8 HOURS SCHEDULED
Status: DISCONTINUED | OUTPATIENT
Start: 2018-12-29 | End: 2018-12-31

## 2018-12-29 RX ORDER — COLCHICINE 0.6 MG/1
0.6 TABLET ORAL 2 TIMES DAILY
Status: DISCONTINUED | OUTPATIENT
Start: 2018-12-29 | End: 2018-12-31

## 2018-12-29 RX ORDER — PANTOPRAZOLE SODIUM 20 MG/1
20 TABLET, DELAYED RELEASE ORAL
Status: DISCONTINUED | OUTPATIENT
Start: 2018-12-30 | End: 2018-12-29

## 2018-12-29 RX ORDER — KETOROLAC TROMETHAMINE 30 MG/ML
30 INJECTION, SOLUTION INTRAMUSCULAR; INTRAVENOUS EVERY 8 HOURS
Status: DISCONTINUED | OUTPATIENT
Start: 2018-12-29 | End: 2018-12-29

## 2018-12-29 RX ORDER — SERTRALINE HYDROCHLORIDE 100 MG/1
200 TABLET, FILM COATED ORAL NIGHTLY
Status: DISCONTINUED | OUTPATIENT
Start: 2018-12-29 | End: 2018-12-31

## 2018-12-29 RX ORDER — PANTOPRAZOLE SODIUM 40 MG/1
40 TABLET, DELAYED RELEASE ORAL
Status: DISCONTINUED | OUTPATIENT
Start: 2018-12-30 | End: 2018-12-31

## 2018-12-29 RX ORDER — MERCAPTOPURINE 50 MG/1
75 TABLET ORAL DAILY
Status: DISCONTINUED | OUTPATIENT
Start: 2018-12-30 | End: 2018-12-31

## 2018-12-29 RX ORDER — INDOMETHACIN 50 MG/1
50 CAPSULE ORAL
Status: DISCONTINUED | OUTPATIENT
Start: 2018-12-30 | End: 2018-12-31

## 2018-12-29 NOTE — ED INITIAL ASSESSMENT (HPI)
Pt to ER c/o symptoms are not improving. Pt with known history of pneumonia x 3 weeks. Pt is on her 2nd round of abx, today is day 7. Pt states she does not feel worse but not better either. Reports chest and upper back discomfort x 3 weeks.

## 2018-12-29 NOTE — PROGRESS NOTES
[facility] Pharmacy Note: Antimicrobial Weight Dose Adjustment for: ceftriaxone (ROCEPHIN)    Elizabet Walker is a 51year old female who has been prescribed ceftriaxone (ROCEPHIN) 1 gram IV every 24 hours.   CrCl is estimated creatinine clearance is 107.4 mL/min (

## 2018-12-29 NOTE — ED PROVIDER NOTES
Patient Seen in: [facility] Emergency Department    History   Patient presents with:  Dyspnea JENARO SOB (respiratory)    Stated Complaint: pneumonia x 3 weeks, on 2nd abx. continues to have cough/sob/fatigue    HPI    Patient presents with symptoms of Ezra   • COLONOSCOPY, POSSIBLE BIOPSY, POSSIBLE POLYPECTOMY 65467 N/A 1/5/2011    Performed by Renaldo Castillo MD at [address], POSSIBLE BIOPSY, POSSIBLE POLYPECTOMY 55434 N/A 11/24/2008    Performed by Criss Chavarria motion. Neck supple. Cardiovascular: Normal rate, regular rhythm, normal heart sounds and intact distal pulses. No murmur heard. Pulmonary/Chest: Effort normal and breath sounds normal. No respiratory distress. Abdominal: Soft.  Bowel sounds are norm 74  Rhythm: Sinus Rhythm  Reading: No acute change when compared to EKG performed 20 December           ED Course as of Dec 29 2657  ------------------------------------------------------------  Time: 12/29 1302  Value: WBC: 12.5  Comment: Improving from 1

## 2018-12-29 NOTE — H&P
[facility] Hospitalist History and Physical      CC: SOB    PCP: Mabel Gloria MD    History of Present Illness: Patient is a 51year old female with PMH sig for GUZMAN presenting with SOB and chest discomfort. Her symptoms first started in early December. AHI 33 supine AHI 79 non-supine AHI 15.9 SaO2 kate 80% CPAP 6 [employer]   • OTHER DISEASES 2000    ulcerative colitis - rectosigmoid   • OTHER DISEASES     ulcerative colitis - rectosigmoid   • Visual impairment         Medications:  Outpatient Meds:    No (SEE COMMENTS)    Comment:Ulcerative Colitis  Ibuprofen               OTHER (SEE COMMENTS)    Comment:Ulcerative Colitis  Levaquin                RASH, ITCHING     Past Social Hx:  Denies heavy ETOH use  Denies illicts    Fam Hx  Family History   Problem R Active Problems:    Anemia    Azotemia    Community acquired pneumonia of left lower lobe of lung ([address]    # SOB, pleuritic chest pain  - Puzzling story- was diagnosed with PNA at [facility] (records reviewed) earlier this month at the time had fever and

## 2018-12-29 NOTE — CONSULTS
[facility] PULMONARY/CRITICAL CARE CONSULTATION    HPI: Pt is a 50 y/o WF with hx of colitis that presents with chest pain and SOB, mainly from splinting. The patient was first evaluated in [facility] while on a trip, was sent home and told that she had anxiety.   Giv Performed by Lyle Sandoval MD at [address] [place] [address]   • COLONOSCOPY, POSSIBLE BIOPSY, POSSIBLE POLYPECTOMY 54635 N/A 11/24/2008    Performed by Lyle Sandoval MD at [address] [place] [address]   • ESOPHAGOGASTRODUODENOSCOPY (EGD) N/A 6/5/2014    Pe Yes   Si mg po bid   Sertraline HCl 100 MG Oral Tab 2018 at Unknown time  No Yes   Sig: Take 2 tablets (200 mg total) by mouth daily. Patient taking differently: Take 200 mg by mouth nightly.      TraMADol HCl 50 MG Oral Tab 2018 at [employer] 5/9/2003        Years since quitting: 15.6      Smokeless tobacco: Never Used    Substance and Sexual Activity      Alcohol use:  Yes        Alcohol/week: 0.6 - 1.2 oz        Types: 1 - 2 Standard drinks or equivalent per week      Drug use: No      Sexual 92.5   MCH  29.4   MCHC  31.8   RDW  13.6   NEPRELIM  8.37*   WBC  12.5   PLT  598.0*     Recent Labs   Lab  12/29/18   1248   GLU  108*   BUN  12   CREATSERUM  0.57   GFRAA  126   GFRNAA  109   CA  8.8   ALB  2.8*   NA  139   K  3.6   CL  106   CO2  26.0

## 2018-12-30 ENCOUNTER — APPOINTMENT (OUTPATIENT)
Dept: CV DIAGNOSTICS | Facility: HOSPITAL | Age: 48
DRG: 315 | End: 2018-12-30
Attending: HOSPITALIST
Payer: COMMERCIAL

## 2018-12-30 LAB
ANION GAP SERPL CALC-SCNC: 6 MMOL/L (ref 0–18)
BASOPHILS # BLD AUTO: 0.05 X10(3) UL (ref 0–0.1)
BASOPHILS NFR BLD AUTO: 0.5 %
BUN BLD-MCNC: 10 MG/DL (ref 8–20)
BUN/CREAT SERPL: 18.5 (ref 10–20)
CALCIUM BLD-MCNC: 8.8 MG/DL (ref 8.3–10.3)
CHLORIDE SERPL-SCNC: 104 MMOL/L (ref 101–111)
CO2 SERPL-SCNC: 26 MMOL/L (ref 22–32)
CREAT BLD-MCNC: 0.54 MG/DL (ref 0.55–1.02)
EOSINOPHIL # BLD AUTO: 0.45 X10(3) UL (ref 0–0.3)
EOSINOPHIL NFR BLD AUTO: 4.1 %
ERYTHROCYTE [DISTWIDTH] IN BLOOD BY AUTOMATED COUNT: 13.8 % (ref 11.5–16)
GLUCOSE BLD-MCNC: 90 MG/DL (ref 70–99)
HCT VFR BLD AUTO: 31.5 % (ref 34–50)
HGB BLD-MCNC: 10.1 G/DL (ref 12–16)
HSCRP: >28.5 MG/L (ref ?–3)
IMMATURE GRANULOCYTE COUNT: 0.1 X10(3) UL (ref 0–1)
IMMATURE GRANULOCYTE RATIO %: 0.9 %
LYMPHOCYTES # BLD AUTO: 2.35 X10(3) UL (ref 0.9–4)
LYMPHOCYTES NFR BLD AUTO: 21.4 %
MCH RBC QN AUTO: 29.8 PG (ref 27–33.2)
MCHC RBC AUTO-ENTMCNC: 32.1 G/DL (ref 31–37)
MCV RBC AUTO: 92.9 FL (ref 81–100)
MONOCYTES # BLD AUTO: 1.17 X10(3) UL (ref 0.1–1)
MONOCYTES NFR BLD AUTO: 10.7 %
NEUTROPHIL ABS PRELIM: 6.85 X10 (3) UL (ref 1.3–6.7)
NEUTROPHILS # BLD AUTO: 6.85 X10(3) UL (ref 1.3–6.7)
NEUTROPHILS NFR BLD AUTO: 62.4 %
OSMOLALITY SERPL CALC.SUM OF ELEC: 281 MOSM/KG (ref 275–295)
PLATELET # BLD AUTO: 604 10(3)UL (ref 150–450)
POTASSIUM SERPL-SCNC: 4 MMOL/L (ref 3.6–5.1)
RBC # BLD AUTO: 3.39 X10(6)UL (ref 3.8–5.1)
RED CELL DISTRIBUTION WIDTH-SD: 47.2 FL (ref 35.1–46.3)
RHEUMATOID FACT SERPL-ACNC: <10 IU/ML (ref ?–15)
SODIUM SERPL-SCNC: 136 MMOL/L (ref 136–144)
WBC # BLD AUTO: 11 X10(3) UL (ref 4–13)

## 2018-12-30 PROCEDURE — 93306 TTE W/DOPPLER COMPLETE: CPT | Performed by: HOSPITALIST

## 2018-12-30 PROCEDURE — 85025 COMPLETE CBC W/AUTO DIFF WBC: CPT | Performed by: HOSPITALIST

## 2018-12-30 PROCEDURE — 80048 BASIC METABOLIC PNL TOTAL CA: CPT | Performed by: HOSPITALIST

## 2018-12-30 NOTE — PROGRESS NOTES
[facility] [address] [place] [address] Cardiology Progress Note        Georgina Hyman Patient Status:  Inpatient    1970 MRN [id number]   [facility] [place] 5NW-A Attending Willis Bonner,*   Hosp Day # 1 PCP Edd Short MD 12/29/18   1248  12/30/18   0637   NA  139  136   K  3.6  4.0   CL  106  104   CO2  26.0  26.0   BUN  12  10   CREATSERUM  0.57  0.54*   CA  8.8  8.8   GLU  108*  90       Recent Labs   Lab  12/29/18   1248   ALT  28   AST  19   ALB  2.8*       Recent Labs

## 2018-12-30 NOTE — PROGRESS NOTES
[facility] PULMONARY/CRITICAL CARE    S: Improved symptoms from yesterday, less CP and SOB.     Meds:  • cefTRIAXone  2 g Intravenous Q24H   • azithromycin  500 mg Intravenous Q24H   • mercaptopurine  75 mg Oral Daily   • Sertraline HCl  200 mg Oral Nightly   • He Assessment and Plan    1.  Chest Pain and SOB -- her SOB is mainly from splinting.  -discussed with cardiology believe this is asso with pericarditis  -improved with colchicine and indocin.    -sent Rheum testing (sed rate and CRP high with normal RF; DANNI a

## 2018-12-30 NOTE — RESPIRATORY THERAPY NOTE
GUZMAN - Equipment Use Daily Summary:                  . Set Mode:  CPAP WITH C-FLEX                . Usage in hours: 2:21                . 90% Pressure (EPAP) level: 6.0                . 90% Insp. Pressure (IPAP): [facility] AHI: 3.6                .  [place]

## 2018-12-30 NOTE — PROGRESS NOTES
NURSING ADMISSION NOTE      Patient admitted via Cart  Oriented to room. Safety precautions initiated. Bed in low position. Call light in reach. Pt received at 1630 from ED with c/o ongoing SOB, pleuritic chest pain, and recurrent PNA.   Consults

## 2018-12-30 NOTE — PROGRESS NOTES
PROBLEM:    ASSESSMENT: Pt is A&Ox4. Anxious; Klonopin given PRN. VSS, afebrile. Maintaining O2 sat WNL on RA. GUZMAN with CPAP. NSR on tele. 2D echo completed. Cool packs and PRN Tramadol given for pleuritic chest pain; reports relief.  Tolerating dietstan

## 2018-12-30 NOTE — PLAN OF CARE
PAIN - ADULT    • Verbalizes/displays adequate comfort level or patient's stated pain goal Progressing        SAFETY ADULT - FALL    • Free from fall injury Progressing        Pt A/O x 4, anxious at times, no distress noted.  Scheduled medications given as

## 2018-12-30 NOTE — PROGRESS NOTES
[facility] Hospitalist Progress Note     Rodolfo Loraineantoine Patient Status:  Inpatient    1970 MRN [id number]   [facility] 5NW-A Attending Ad Edwardsr,*   Hosp Day # 1 PCP [place] Martinez MD     CC: follow up    SUBJECTIVE: sq    Dispo: floor    Questions/concerns were discussed with patient and/or family by bedside.     Cheri Figueroa MD   [facility]  [phone]

## 2018-12-30 NOTE — CONSULTS
[facility] Cardiology Consultation    aNseem Garrido Patient Status:  Inpatient    1970 MRN [id number]   [facility] 5NW-A Attending Marlon Goodson,*   Hosp Day # 0 PCP Margret Powell MD     Reason for Consultation:  sasha Ezra   • COLONOSCOPY, POSSIBLE BIOPSY, POSSIBLE POLYPECTOMY 05135 N/A 11/24/2008    Performed by Shaila Zapata MD at [address] [place] [address]   • ESOPHAGOGASTRODUODENOSCOPY (EGD) N/A 6/5/2014    Performed by Mala Lawson MD at [facility] ENDOSCOPY injection  30 mg Intravenous Q8H       Continuous Infusions:      Social History:   reports that she quit smoking about 15 years ago. she has never used smokeless tobacco. She reports that she drinks about 0.6 - 1.2 oz of alcohol per week.  She reports that

## 2018-12-31 VITALS
BODY MASS INDEX: 37.83 KG/M2 | HEART RATE: 69 BPM | DIASTOLIC BLOOD PRESSURE: 79 MMHG | HEIGHT: 65 IN | TEMPERATURE: 99 F | OXYGEN SATURATION: 98 % | RESPIRATION RATE: 16 BRPM | WEIGHT: 227.06 LBS | SYSTOLIC BLOOD PRESSURE: 125 MMHG

## 2018-12-31 LAB — ANA SCREEN: NEGATIVE

## 2018-12-31 RX ORDER — INDOMETHACIN 50 MG/1
50 CAPSULE ORAL
Qty: 45 CAPSULE | Refills: 0 | Status: SHIPPED | OUTPATIENT
Start: 2018-12-31 | End: 2019-01-22

## 2018-12-31 RX ORDER — COLCHICINE 0.6 MG/1
0.6 TABLET ORAL 2 TIMES DAILY
Qty: 60 TABLET | Refills: 1 | Status: SHIPPED | OUTPATIENT
Start: 2018-12-31 | End: 2019-02-15 | Stop reason: ALTCHOICE

## 2018-12-31 NOTE — PROGRESS NOTES
[facility] [address] [place] [address] Cardiology Progress Note        Byron Zuniga Patient Status:  Inpatient    1970 MRN [id number]   [facility] 5NW-A Attending Rosalinda Garrison,*   Hosp Day # 2 PCP Rios George MD 12/29/18   1248  12/30/18   0637   WBC  12.5  11.0   HGB  10.6*  10.1*   PLT  598.0*  604.0*       Chem:  Recent Labs   Lab  12/29/18   1248  12/30/18   0637   NA  139  136   K  3.6  4.0   CL  106  104   CO2  26.0  26.0   BUN  12  10   CREATSERUM  0.57  0.

## 2018-12-31 NOTE — RESPIRATORY THERAPY NOTE
GUZMAN Equipment Usage Summary :            Set Mode : CPAP W FLEX          Usage in Hours:00;41          90% Pressure (EPAP) : 6           90% Insp Pressure (IPAP);           AHI : 4.7          Supplemental Oxygen :      LPM

## 2018-12-31 NOTE — PROGRESS NOTES
NURSING DISCHARGE NOTE    Discharged Home via Wheelchair. Accompanied by Support staff  Belongings Taken by patient/family. Discharge packet reviewed with patient. All questions answered. PIV removed. Belongings returned to patient.  Kiel queen

## 2018-12-31 NOTE — DISCHARGE SUMMARY
General Medicine Discharge Summary     Patient ID:  Hernan Parr  51year old  1/21/1970    Admit date: 12/29/2018    Discharge date and time: 12/31/2018  1:58 PM     Attending Physician: No att. provide ~10  - No evidence of bleeding  - iron low however TIBC ok.  ?ACD  - f/u with PCP as OP for further discussion and w/u     # UC  - Home meds, denies any symptoms of current flair     # Anxiety  - Home meds        Consults: IP CONSULT TO PULMONOLOGY  IP CONS CLINICAL INDICATION: Lobar pneumonia, unspecified organism. COMPARISON STUDY: None available. Frontal and lateral views of the chest were obtained. FINDINGS: Cardiac size and pulmonary vascularity are within normal limits.  Vague linear opacities project ov CONTRAST USED:  85cc of Omnipaque 350  FINDINGS:  VASCULATURE:  No acute pulmonary embolus. Aorta is normal in caliber. No evidence of aortic aneurysm or dissection. Normal 3 vessel arch. LUNGS:  Left lower lobe infiltrates suggestive of pneumonia.   Con calcifications of the aorta. CONCLUSION:  Minimal bibasilar opacities, left greater than right, representing atelectasis versus infiltrates.      Dictated by: Nunu Gusman MD on 12/20/2018 at 21:58     Approved by: Nunu Gusman MD 100 MG Oral Cap    Amoxicillin-Pot Clavulanate 875-125 MG Oral Tab            Code Status: Full Code      Exam on day of discharge:      12/31/18  1112   BP: 125/79   Pulse:    Resp: 16   Temp: 98.7 °F (37.1 °C)       Alert oriented.        Total time for e

## 2018-12-31 NOTE — CM/SW NOTE
12/31/18 1100   CM/SW Screening   Referral Source    Information Source Chart review;Nursing rounds   Patient's Mental Status Alert;Oriented   Patient's [address]   Patient lives with Children   Patient Status Prior to Admission

## 2019-01-02 LAB
MYELOPEROX ANTIBODIES, IGG: 11 AU/ML
SERINE PROTEASE3, IGG: 1 AU/ML

## 2019-01-22 PROBLEM — I30.1 ACUTE VIRAL PERICARDITIS: Status: ACTIVE | Noted: 2019-01-22

## 2019-05-15 PROBLEM — M48.061 LUMBAR FORAMINAL STENOSIS: Status: ACTIVE | Noted: 2019-05-15

## 2019-05-15 PROBLEM — M51.26 HNP (HERNIATED NUCLEUS PULPOSUS), LUMBAR: Status: ACTIVE | Noted: 2019-05-15

## 2019-10-26 ENCOUNTER — APPOINTMENT (OUTPATIENT)
Dept: GENERAL RADIOLOGY | Facility: HOSPITAL | Age: 49
End: 2019-10-26
Attending: EMERGENCY MEDICINE
Payer: COMMERCIAL

## 2019-10-26 ENCOUNTER — HOSPITAL ENCOUNTER (EMERGENCY)
Facility: HOSPITAL | Age: 49
Discharge: HOME | End: 2019-10-26
Attending: EMERGENCY MEDICINE
Payer: COMMERCIAL

## 2019-10-26 VITALS
OXYGEN SATURATION: 99 % | TEMPERATURE: 98 F | HEART RATE: 72 BPM | BODY MASS INDEX: 37.49 KG/M2 | HEIGHT: 65 IN | DIASTOLIC BLOOD PRESSURE: 93 MMHG | RESPIRATION RATE: 19 BRPM | SYSTOLIC BLOOD PRESSURE: 145 MMHG | WEIGHT: 225 LBS

## 2019-10-26 DIAGNOSIS — M19.90 ACUTE ARTHRITIS: Primary | ICD-10-CM

## 2019-10-26 PROCEDURE — 84550 ASSAY OF BLOOD/URIC ACID: CPT | Performed by: EMERGENCY MEDICINE

## 2019-10-26 PROCEDURE — 64450 NJX AA&/STRD OTHER PN/BRANCH: CPT

## 2019-10-26 PROCEDURE — 99284 EMERGENCY DEPT VISIT MOD MDM: CPT

## 2019-10-26 PROCEDURE — 85025 COMPLETE CBC W/AUTO DIFF WBC: CPT | Performed by: EMERGENCY MEDICINE

## 2019-10-26 PROCEDURE — 80053 COMPREHEN METABOLIC PANEL: CPT | Performed by: EMERGENCY MEDICINE

## 2019-10-26 PROCEDURE — 85652 RBC SED RATE AUTOMATED: CPT | Performed by: EMERGENCY MEDICINE

## 2019-10-26 PROCEDURE — 73110 X-RAY EXAM OF WRIST: CPT | Performed by: EMERGENCY MEDICINE

## 2019-10-26 PROCEDURE — 96374 THER/PROPH/DIAG INJ IV PUSH: CPT

## 2019-10-26 RX ORDER — PREDNISONE 20 MG/1
40 TABLET ORAL DAILY
Qty: 8 TABLET | Refills: 0 | Status: SHIPPED | OUTPATIENT
Start: 2019-10-26 | End: 2019-10-30

## 2019-10-26 RX ORDER — HYDROCODONE BITARTRATE AND ACETAMINOPHEN 5; 325 MG/1; MG/1
2 TABLET ORAL ONCE
Status: COMPLETED | OUTPATIENT
Start: 2019-10-26 | End: 2019-10-26

## 2019-10-26 RX ORDER — MAGNESIUM HYDROXIDE/ALUMINUM HYDROXICE/SIMETHICONE 120; 1200; 1200 MG/30ML; MG/30ML; MG/30ML
30 SUSPENSION ORAL ONCE
Status: COMPLETED | OUTPATIENT
Start: 2019-10-26 | End: 2019-10-26

## 2019-10-26 RX ORDER — HYDROMORPHONE HYDROCHLORIDE 1 MG/ML
0.5 INJECTION, SOLUTION INTRAMUSCULAR; INTRAVENOUS; SUBCUTANEOUS ONCE
Status: COMPLETED | OUTPATIENT
Start: 2019-10-26 | End: 2019-10-26

## 2019-10-26 RX ORDER — TRAMADOL HYDROCHLORIDE 50 MG/1
TABLET ORAL EVERY 6 HOURS PRN
Qty: 10 TABLET | Refills: 0 | Status: SHIPPED | OUTPATIENT
Start: 2019-10-26 | End: 2019-11-02

## 2019-10-26 RX ORDER — PREDNISONE 20 MG/1
60 TABLET ORAL ONCE
Status: COMPLETED | OUTPATIENT
Start: 2019-10-26 | End: 2019-10-26

## 2019-10-26 NOTE — ED INITIAL ASSESSMENT (HPI)
Pt to ED c/o left wrist pain since yesterday, worse since 3 AM today when pain woke Pt up. Pt denies any trauma or injury.

## 2019-10-26 NOTE — ED NOTES
RN at bedside to discharge patient - patient stating that she is not ready to go home because she is still in pain and would like to speak with MD    MD aware at this time of patient request and is currently at patient bedside

## 2019-10-26 NOTE — ED PROVIDER NOTES
Patient Seen in: [facility] Emergency Department      History   Patient presents with:  Upper Extremity Injury (musculoskeletal)    Stated Complaint: left wrist pain, no falls no injuris    HPI    47year-old patient with a history of ulcerative col N/A 11/24/2008    Performed by Seema España MD at [address] [place] [address]   • ESOPHAGOGASTRODUODENOSCOPY (EGD) N/A 6/5/2014    Performed by River Vasquez MD at [facility] ENDOSCOPY   • ESOPHAGOGASTRODUODENOSCOPY, COLONOSCOPY, POSSIBLE BIOPSY, POSSIBLE P are clear to auscultation heart has regular rate and rhythm without murmurs rubs or gallops she has severe tenderness to palpation when I touch the base of her left anatomic snuffbox.   No swelling no significant bruising positive radial pulses    ED Course dislocation is identified. Mild to moderate 1st carpometacarpal osteoarthritic changes are noted. Mild loss of radiocarpal joint space.     IMPRESSION:    No fracture or dislocation in the left wrist.              Dictated [place]: [place] [address]  [phone]    Schedule an appointment as soon as possible for a visit      Karma Leger, [address] [facility] [phone]    Schedule an appointment as soon as possible for a visit          Medications P

## 2020-06-08 ENCOUNTER — LAB ENCOUNTER (OUTPATIENT)
Dept: LAB | Age: 50
End: 2020-06-08
Attending: INTERNAL MEDICINE
Payer: MEDICAID

## 2020-06-08 DIAGNOSIS — K51.50 LEFT SIDED ULCERATIVE (CHRONIC) COLITIS (HCC): ICD-10-CM

## 2020-06-08 PROCEDURE — 87340 HEPATITIS B SURFACE AG IA: CPT

## 2020-06-08 PROCEDURE — 36415 COLL VENOUS BLD VENIPUNCTURE: CPT

## 2020-06-08 PROCEDURE — 86480 TB TEST CELL IMMUN MEASURE: CPT

## 2020-10-06 ENCOUNTER — APPOINTMENT (OUTPATIENT)
Dept: LAB | Age: 50
End: 2020-10-06
Attending: ORTHOPAEDIC SURGERY
Payer: MEDICAID

## 2020-10-06 DIAGNOSIS — R22.31 MASS OF FINGER OF RIGHT HAND: ICD-10-CM

## 2020-10-08 ENCOUNTER — HOSPITAL ENCOUNTER (OUTPATIENT)
Facility: HOSPITAL | Age: 50
Setting detail: HOSPITAL OUTPATIENT SURGERY
Discharge: HOME | End: 2020-10-08
Attending: ORTHOPAEDIC SURGERY | Admitting: ORTHOPAEDIC SURGERY
Payer: MEDICAID

## 2020-10-08 VITALS
RESPIRATION RATE: 16 BRPM | TEMPERATURE: 99 F | DIASTOLIC BLOOD PRESSURE: 83 MMHG | OXYGEN SATURATION: 99 % | HEIGHT: 65 IN | SYSTOLIC BLOOD PRESSURE: 124 MMHG | BODY MASS INDEX: 39.22 KG/M2 | HEART RATE: 74 BPM | WEIGHT: 235.44 LBS

## 2020-10-08 DIAGNOSIS — R22.31 MASS OF FINGER OF RIGHT HAND: Primary | ICD-10-CM

## 2020-10-08 PROCEDURE — 88305 TISSUE EXAM BY PATHOLOGIST: CPT | Performed by: ORTHOPAEDIC SURGERY

## 2020-10-08 PROCEDURE — 0JBJ0ZZ EXCISION OF RIGHT HAND SUBCUTANEOUS TISSUE AND FASCIA, OPEN APPROACH: ICD-10-PCS | Performed by: ORTHOPAEDIC SURGERY

## 2020-10-08 PROCEDURE — 88304 TISSUE EXAM BY PATHOLOGIST: CPT | Performed by: ORTHOPAEDIC SURGERY

## 2020-10-08 RX ORDER — BUPIVACAINE HYDROCHLORIDE 5 MG/ML
INJECTION, SOLUTION EPIDURAL; INTRACAUDAL AS NEEDED
Status: DISCONTINUED | OUTPATIENT
Start: 2020-10-08 | End: 2020-10-08 | Stop reason: HOSPADM

## 2020-10-08 RX ORDER — ACETAMINOPHEN AND CODEINE PHOSPHATE 300; 30 MG/1; MG/1
1-2 TABLET ORAL EVERY 6 HOURS PRN
Qty: 10 TABLET | Refills: 1 | Status: SHIPPED | OUTPATIENT
Start: 2020-10-08 | End: 2020-11-27 | Stop reason: ALTCHOICE

## 2020-10-08 NOTE — BRIEF OP NOTE
Pre-Operative Diagnosis: Mass of finger of right hand [R22.31]     Post-Operative Diagnosis: Mass of finger of right hand [R22.31]      Procedure Performed:   Procedure(s):  RIGHT THUMB MASS EXCISION    Surgeon(s) and Role:     Frances Valenzuela MD - [facility]

## 2020-10-08 NOTE — H&P
Progress Notes       HPI:    Patient ID: Naseem Garrido is a 45year old female.     She comes today for follow-up of her right thumb. She notes the thumb continues to be painful. She states pain is a throbbing ache that worsens with direct pressure.   Sh Diagnosis Date   • ALLERGIC RHINITIS     • Anxiety state     • Back problem     • Colitis     • Depression     • Esophageal reflux 2002   • HEMORRHOIDS       int   • Migraine without aura, without mention of intractable migraine without mention of status Africa Salazar MD at [address] [facility] N/A 6/3/2019     Performed by Africa Salazar MD at [address]   • OTHER SURGICAL HISTORY   2008     sinus surgery   • REMOVAL GALLBLADDER                   Fa Psychiatric: She has a normal mood and affect.  Her behavior is normal.              ASSESSMENT/PLAN:   Mass of finger of right hand  (primary encounter diagnosis)     Orders Placed This Encounter      Est, Level IV [46197]     In discussing treatment opt

## 2020-10-09 NOTE — OPERATIVE REPORT
[facility]    PATIENT'S NAME: Ethan Caballero   ATTENDING PHYSICIAN: Angi De La Rosa M.D. OPERATING PHYSICIAN: Angi De La Rosa M.D.    PATIENT ACCOUNT#:   [de-identified]    LOCATION:  [address] [place] [address] 11 EDWP 10  MEDICAL RECORD #:   [id number]       DATE rolled in retrograde fashion down the thumb to act as exsanguination device as well as tourniquet. A longitudinal incision overlying the mass was performed. The mass was at the mid lateral portion overlying the interphalangeal joint.   Dissection was webb

## 2020-10-13 NOTE — PROGRESS NOTES
Pathology reviewed, fibrosed tissue present. We can discuss further treatment options at the next appointment.

## 2021-01-08 ENCOUNTER — LAB ENCOUNTER (OUTPATIENT)
Dept: LAB | Facility: HOSPITAL | Age: 51
End: 2021-01-08
Attending: INTERNAL MEDICINE
Payer: MEDICAID

## 2021-01-08 DIAGNOSIS — K51.90 ULCERATIVE COLITIS (HCC): ICD-10-CM

## 2021-01-09 LAB — SARS-COV-2 RNA RESP QL NAA+PROBE: NOT DETECTED

## 2021-01-10 ENCOUNTER — ANESTHESIA EVENT (OUTPATIENT)
Dept: ENDOSCOPY | Facility: HOSPITAL | Age: 51
End: 2021-01-10
Payer: MEDICAID

## 2021-01-10 NOTE — PROGRESS NOTES
The nasal swab test for the the COVID-19 virus was negative.     You may proceed with the planned endoscopy at [facility].    Hendrick Moritz MD, 01/10/21, 3:07 PM

## 2021-01-10 NOTE — OPERATIVE REPORT
PATIENT NAME: Edy Escobedo  MRN: [id number]  DATE OF OPERATION:  1/11/21  REFERRING PHYSICIAN: Dr. Cantrell Corporal  Medications:  MAC  Date of last COLONOSCOPY:  2018  PREOPERATIVE DIAGNOSIS                Chronic (2000) left sided ulcerative colitis  POSTO cold biopsy forceps. Otherwise normal colon and terminal ileum. Random right and left colon biopsies done to assess for dysplasia. Diverticulosis were scattered throughout the colon.   On retroflexion of the scope in the anorectum, normal internal hemorr

## 2021-01-10 NOTE — ANESTHESIA PREPROCEDURE EVALUATION
PRE-OP EVALUATION    Patient Name: Korin Hyatt    Pre-op Diagnosis: Left sided ulcerative (chronic) colitis [facility] [K51.50]    Procedure(s):  COLONOSCOPY    Surgeon(s) and Role:     * Viviana Leigh MD - Primary    Pre-op vitals reviewed.         Body m Evaluation    Patient summary reviewed.     Anesthetic Complications           GI/Hepatic/Renal      (+) GERD                    (+) ulcerative colitis       Cardiovascular                (+) obesity                                       Endo/Other Performed by Helane Closs, MD at [address] [facility]   • OTHER SURGICAL HISTORY  2008    sinus surgery   • REMOVAL GALLBLADDER       Social History    Tobacco Use      Smoking status: Former Smoker        Quit date: 5/9/2003        Years sin

## 2021-01-10 NOTE — H&P
Taras Chiu presents for evaluation and management of chronic (2000) ulcerative (left sided)  Colitis, and chronic GERD. Current gastrointestinal complaints include none. GERD controlled on omeprazole 40 mg bid which pt is purchasing over the counter.               BDI patient present for follow up for ulcerative colitis.  The patient is flaring with moderate UC with bloody diarrhea and abdominal pain since stopping entyvio (started 11/2018) due to loss of insurance.                  GERD controlled wit Matteo Lyon MD      PROCEDURE AND FINDINGS: The patient was placed into the left lateral decubitus after informed consent was obtained.  All questions were answered.  An updated history and physical were performed and an ASA score of 2 was assi       2. Repeat colonoscopy in 2 years.     PATHOLOGY TISSUE P: [id number]  Order: [id number]  Collected:  9/24/2018  9:46 AM Status:  Final result   Visible to patient:  Yes ([place]) Dx:  Ulcerative colitis with complication,...   Component     Case Report • Sertraline HCl 100 MG Oral Tab Take 2 tablets (200 mg total) by mouth nightly.  60 tablet 0   • Clobetasol Propionate 0.05 % External Ointment Apply to Hands QPM Monday through Friday, hold on the weekends, then repeat prn 60 g 1   • Clobetasol Propionate   ulcerative colitis - rectosigmoid            Past Surgical History:   Procedure Laterality Date   • APPENDECTOMY       •           x 1   • CHOLECYSTECTOMY       • COLONOSCOPY N/A 2014     Performed by Jason Mooney MD at [facility] ENDOSCOPY Alcohol/week: 1.0 - 2.0 standard drinks      Types: 1 - 2 Standard drinks or equivalent per week      Frequency: 2-3 times a week      Drinks per session: 1 or 2      Binge frequency: Less than monthly      Comment: social    Drug use:  No       The patient present for follow up for ulcerative colitis. The patient is in remission on entyvio 300 mg iv every 8 weeks.   Discussed health maintenance issues, vaccinations, colon cancer screening, medication management with focus on risks a

## 2021-01-11 ENCOUNTER — ANESTHESIA (OUTPATIENT)
Dept: ENDOSCOPY | Facility: HOSPITAL | Age: 51
End: 2021-01-11
Payer: MEDICAID

## 2021-01-11 ENCOUNTER — HOSPITAL ENCOUNTER (OUTPATIENT)
Facility: HOSPITAL | Age: 51
Setting detail: HOSPITAL OUTPATIENT SURGERY
Discharge: HOME | End: 2021-01-11
Attending: INTERNAL MEDICINE | Admitting: INTERNAL MEDICINE
Payer: MEDICAID

## 2021-01-11 VITALS
DIASTOLIC BLOOD PRESSURE: 74 MMHG | TEMPERATURE: 98 F | SYSTOLIC BLOOD PRESSURE: 121 MMHG | RESPIRATION RATE: 18 BRPM | OXYGEN SATURATION: 98 % | HEIGHT: 65 IN | WEIGHT: 240 LBS | BODY MASS INDEX: 39.99 KG/M2 | HEART RATE: 67 BPM

## 2021-01-11 DIAGNOSIS — K51.90 ULCERATIVE COLITIS (HCC): Primary | ICD-10-CM

## 2021-01-11 DIAGNOSIS — K51.50 LEFT SIDED ULCERATIVE (CHRONIC) COLITIS (HCC): ICD-10-CM

## 2021-01-11 LAB
POCT LOT NUMBER: NORMAL
POCT URINE PREGNANCY: NEGATIVE
PROCEDURE CONTROL: PRESENT

## 2021-01-11 PROCEDURE — 0DBM8ZX EXCISION OF DESCENDING COLON, VIA NATURAL OR ARTIFICIAL OPENING ENDOSCOPIC, DIAGNOSTIC: ICD-10-PCS | Performed by: INTERNAL MEDICINE

## 2021-01-11 PROCEDURE — 88305 TISSUE EXAM BY PATHOLOGIST: CPT | Performed by: INTERNAL MEDICINE

## 2021-01-11 PROCEDURE — 81025 URINE PREGNANCY TEST: CPT | Performed by: INTERNAL MEDICINE

## 2021-01-11 PROCEDURE — 0DBP8ZX EXCISION OF RECTUM, VIA NATURAL OR ARTIFICIAL OPENING ENDOSCOPIC, DIAGNOSTIC: ICD-10-PCS | Performed by: INTERNAL MEDICINE

## 2021-01-11 PROCEDURE — 0DBG8ZX EXCISION OF LEFT LARGE INTESTINE, VIA NATURAL OR ARTIFICIAL OPENING ENDOSCOPIC, DIAGNOSTIC: ICD-10-PCS | Performed by: INTERNAL MEDICINE

## 2021-01-11 PROCEDURE — 0DBF8ZX EXCISION OF RIGHT LARGE INTESTINE, VIA NATURAL OR ARTIFICIAL OPENING ENDOSCOPIC, DIAGNOSTIC: ICD-10-PCS | Performed by: INTERNAL MEDICINE

## 2021-01-11 RX ORDER — LIDOCAINE HYDROCHLORIDE 10 MG/ML
INJECTION, SOLUTION EPIDURAL; INFILTRATION; INTRACAUDAL; PERINEURAL AS NEEDED
Status: DISCONTINUED | OUTPATIENT
Start: 2021-01-11 | End: 2021-01-11 | Stop reason: SURG

## 2021-01-11 RX ORDER — SODIUM CHLORIDE, SODIUM LACTATE, POTASSIUM CHLORIDE, CALCIUM CHLORIDE 600; 310; 30; 20 MG/100ML; MG/100ML; MG/100ML; MG/100ML
INJECTION, SOLUTION INTRAVENOUS CONTINUOUS
Status: DISCONTINUED | OUTPATIENT
Start: 2021-01-11 | End: 2021-01-11

## 2021-01-11 RX ORDER — NALOXONE HYDROCHLORIDE 0.4 MG/ML
80 INJECTION, SOLUTION INTRAMUSCULAR; INTRAVENOUS; SUBCUTANEOUS AS NEEDED
Status: DISCONTINUED | OUTPATIENT
Start: 2021-01-11 | End: 2021-01-11

## 2021-01-11 RX ORDER — ONDANSETRON 2 MG/ML
4 INJECTION INTRAMUSCULAR; INTRAVENOUS AS NEEDED
Status: DISCONTINUED | OUTPATIENT
Start: 2021-01-11 | End: 2021-01-11

## 2021-01-11 RX ORDER — DIPHENHYDRAMINE HYDROCHLORIDE 50 MG/ML
12.5 INJECTION INTRAMUSCULAR; INTRAVENOUS AS NEEDED
Status: DISCONTINUED | OUTPATIENT
Start: 2021-01-11 | End: 2021-01-11

## 2021-01-11 RX ORDER — METOCLOPRAMIDE HYDROCHLORIDE 5 MG/ML
10 INJECTION INTRAMUSCULAR; INTRAVENOUS AS NEEDED
Status: DISCONTINUED | OUTPATIENT
Start: 2021-01-11 | End: 2021-01-11

## 2021-01-11 RX ORDER — HYDROMORPHONE HYDROCHLORIDE 1 MG/ML
0.4 INJECTION, SOLUTION INTRAMUSCULAR; INTRAVENOUS; SUBCUTANEOUS EVERY 5 MIN PRN
Status: DISCONTINUED | OUTPATIENT
Start: 2021-01-11 | End: 2021-01-11

## 2021-01-11 RX ADMIN — LIDOCAINE HYDROCHLORIDE 50 MG: 10 INJECTION, SOLUTION EPIDURAL; INFILTRATION; INTRACAUDAL; PERINEURAL at 09:02:00

## 2021-01-11 NOTE — ANESTHESIA POSTPROCEDURE EVALUATION
[address] Patient Status:  Hospital Outpatient Surgery   Age/Gender 45year old female MRN [id number]   Location [facility]. Attending Magdi Zaman MD   Hosp Day # 0 PCP Veronica Briseno MD       Anesthesia P

## 2021-01-11 NOTE — BRIEF OP NOTE
Pre-Operative Diagnosis: Left sided ulcerative (chronic) colitis (HCC) [K51.50]     Post-Operative Diagnosis: Colon polyps      Procedure Performed:   Procedure(s):  COLONOSCOPY with biopsies and forcep polypectomy     Surgeon(s) and Role:     * MARIAH Rodrigues

## 2021-05-04 PROBLEM — M79.651 PAIN IN BOTH THIGHS: Status: ACTIVE | Noted: 2021-05-04

## 2021-05-04 PROBLEM — M51.36 DDD (DEGENERATIVE DISC DISEASE), LUMBAR: Status: ACTIVE | Noted: 2021-05-04

## 2021-05-04 PROBLEM — M79.652 PAIN IN BOTH THIGHS: Status: ACTIVE | Noted: 2021-05-04

## 2022-03-27 ENCOUNTER — EXTERNAL RECORD (OUTPATIENT)
Dept: GASTROENTEROLOGY | Age: 52
End: 2022-03-27

## 2022-03-27 ENCOUNTER — EXTERNAL RECORD (OUTPATIENT)
Dept: OTHER | Age: 52
End: 2022-03-27

## 2022-03-27 LAB
*MEAN CORPUSCULAR HGB CONC: 32.9 G/DL (ref 32.5–35.8)
A/G RATIO: 1.19 (ref 1.1–2.4)
ALANINE AMINOTRANSFE: 22 U/L (ref 7–52)
ALBUMIN, SERUM (ALB): 3.8 G/DL (ref 3.5–5.7)
ALKALINE PHOSPHATASE (ALK): 54 U/L (ref 34–104)
ANION GAP: 8 MEQ/L (ref 6.2–14.7)
ASPARTATE AMINOTRANS: 16 U/L (ref 13–39)
BASOPHIL AUTOMATED: 0 %
BASOPHILS: 0 (ref 0–0.14)
BILIRUBIN, TOTAL: 0.4 MG/DL (ref 0.2–0.8)
BLOOD UREA NITROGEN (BUN): 16 MG/DL (ref 7–25)
BUN/CREATININE RATIO: 28.6 (ref 7.4–23)
CALCIUM, SERUM: 8.7 MG/DL (ref 8.6–10.3)
CARBON DIOXIDE: 24 MEQ/L (ref 21–31)
CHLORIDE, SERUM: 107 MMOL/L (ref 98–107)
CREATININE: 0.56 MG/DL (ref 0.6–1.2)
EOSINOPHIL AUTOMATED: 0 %
EOSINOPHILS: 0 (ref 0–0.6)
EST GLOMERULAR FILTRATION RATE: > 60 ML/MIN
GLUCOSE: 138 MG/DL (ref 70–99)
HEMATOCRIT: 42.2 % (ref 34.7–45.1)
HEMOGLOBIN: 13.9 GM/DL (ref 12–15.3)
K (POTASSIUM, SERUM): 3.6 MMOL/L (ref 3.5–5.2)
LYMPHOCYTE AUTOMATED: 9.8 %
LYMPHOCYTES: 0.8 (ref 0.78–3.73)
MEAN CORPUSCULAR HGB: 29.2 PG (ref 26–34)
MEAN CORPUSCULAR VOL: 88.8 FL (ref 80–100)
MEAN PLATELET VOLUME: 10.3 FL (ref 6.8–10.2)
MG (MAGNESIUM, SERUM: 2.3 MG/DL (ref 1.6–2.6)
MONOCYTE AUTOMATED: 7.2 %
MONOCYTES: 0.6 (ref 0.17–1)
NA (SODIUM, SERUM): 139 MMOL/L (ref 133–144)
NEUTROPHIL ABSOLUTE: 7 (ref 1.91–7.6)
NEUTROPHIL AUTOMATED: 83 %
PLATELET COUNT: 281 K/MM3 (ref 150–450)
PROTEIN TOTAL: 7 G/DL (ref 6.4–8.9)
RED BLOOD CELL COUNT: 4.75 M/MM3 (ref 3.63–5.04)
RED CELL DISTRIBUTIO: 13.9 % (ref 11.9–15.9)
WHITE BLOOD CELL COU: 8.4 K/MM3 (ref 4–11)

## 2022-03-27 PROCEDURE — 99236 HOSP IP/OBS SAME DATE HI 85: CPT | Performed by: HOSPITALIST

## 2022-03-27 PROCEDURE — 99254 IP/OBS CNSLTJ NEW/EST MOD 60: CPT | Performed by: INTERNAL MEDICINE

## 2022-04-01 ENCOUNTER — APPOINTMENT (OUTPATIENT)
Dept: GENERAL RADIOLOGY | Facility: HOSPITAL | Age: 52
End: 2022-04-01
Attending: EMERGENCY MEDICINE
Payer: MEDICAID

## 2022-04-01 ENCOUNTER — HOSPITAL ENCOUNTER (INPATIENT)
Facility: HOSPITAL | Age: 52
LOS: 5 days | Discharge: HOME | End: 2022-04-06
Attending: EMERGENCY MEDICINE | Admitting: INTERNAL MEDICINE
Payer: MEDICAID

## 2022-04-01 ENCOUNTER — APPOINTMENT (OUTPATIENT)
Dept: CT IMAGING | Facility: HOSPITAL | Age: 52
End: 2022-04-01
Attending: EMERGENCY MEDICINE
Payer: MEDICAID

## 2022-04-01 DIAGNOSIS — K57.92 ACUTE DIVERTICULITIS: Primary | ICD-10-CM

## 2022-04-01 DIAGNOSIS — D72.829 LEUKOCYTOSIS, UNSPECIFIED TYPE: ICD-10-CM

## 2022-04-01 LAB
ALBUMIN SERPL-MCNC: 3.4 G/DL (ref 3.4–5)
ALBUMIN/GLOB SERPL: 0.7 {RATIO} (ref 1–2)
ALP LIVER SERPL-CCNC: 88 U/L
ALT SERPL-CCNC: 45 U/L
ANION GAP SERPL CALC-SCNC: 5 MMOL/L (ref 0–18)
AST SERPL-CCNC: 23 U/L (ref 15–37)
BASOPHILS # BLD AUTO: 0.09 X10(3) UL (ref 0–0.2)
BASOPHILS NFR BLD AUTO: 0.4 %
BILIRUB SERPL-MCNC: 0.7 MG/DL (ref 0.1–2)
BILIRUB UR QL STRIP.AUTO: NEGATIVE
BUN BLD-MCNC: 9 MG/DL (ref 7–18)
CALCIUM BLD-MCNC: 9.6 MG/DL (ref 8.5–10.1)
CHLORIDE SERPL-SCNC: 104 MMOL/L (ref 98–112)
CLARITY UR REFRACT.AUTO: CLEAR
CO2 SERPL-SCNC: 28 MMOL/L (ref 21–32)
CREAT BLD-MCNC: 0.79 MG/DL
EOSINOPHIL # BLD AUTO: 0.44 X10(3) UL (ref 0–0.7)
EOSINOPHIL NFR BLD AUTO: 2.2 %
ERYTHROCYTE [DISTWIDTH] IN BLOOD BY AUTOMATED COUNT: 13.3 %
GLOBULIN PLAS-MCNC: 4.7 G/DL (ref 2.8–4.4)
GLUCOSE BLD-MCNC: 95 MG/DL (ref 70–99)
GLUCOSE UR STRIP.AUTO-MCNC: NEGATIVE MG/DL
HCT VFR BLD AUTO: 43.7 %
HGB BLD-MCNC: 14.7 G/DL
IMM GRANULOCYTES # BLD AUTO: 0.13 X10(3) UL (ref 0–1)
IMM GRANULOCYTES NFR BLD: 0.6 %
KETONES UR STRIP.AUTO-MCNC: NEGATIVE MG/DL
LEUKOCYTE ESTERASE UR QL STRIP.AUTO: NEGATIVE
LIPASE SERPL-CCNC: 161 U/L (ref 73–393)
LYMPHOCYTES # BLD AUTO: 3.73 X10(3) UL (ref 1–4)
LYMPHOCYTES NFR BLD AUTO: 18.4 %
MCH RBC QN AUTO: 29.8 PG (ref 26–34)
MCHC RBC AUTO-ENTMCNC: 33.6 G/DL (ref 31–37)
MCV RBC AUTO: 88.5 FL
MONOCYTES # BLD AUTO: 2.15 X10(3) UL (ref 0.1–1)
MONOCYTES NFR BLD AUTO: 10.6 %
NEUTROPHILS # BLD AUTO: 13.77 X10 (3) UL (ref 1.5–7.7)
NEUTROPHILS # BLD AUTO: 13.77 X10(3) UL (ref 1.5–7.7)
NEUTROPHILS NFR BLD AUTO: 67.8 %
NITRITE UR QL STRIP.AUTO: NEGATIVE
OSMOLALITY SERPL CALC.SUM OF ELEC: 282 MOSM/KG (ref 275–295)
PH UR STRIP.AUTO: 5 [PH] (ref 5–8)
PLATELET # BLD AUTO: 308 10(3)UL (ref 150–450)
POTASSIUM SERPL-SCNC: 3.1 MMOL/L (ref 3.5–5.1)
PROT SERPL-MCNC: 8.1 G/DL (ref 6.4–8.2)
PROT UR STRIP.AUTO-MCNC: 30 MG/DL
RBC # BLD AUTO: 4.94 X10(6)UL
SARS-COV-2 RNA RESP QL NAA+PROBE: NOT DETECTED
SODIUM SERPL-SCNC: 137 MMOL/L (ref 136–145)
SP GR UR STRIP.AUTO: 1.03 (ref 1–1.03)
UROBILINOGEN UR STRIP.AUTO-MCNC: <2 MG/DL
WBC # BLD AUTO: 20.3 X10(3) UL (ref 4–11)

## 2022-04-01 PROCEDURE — 93005 ELECTROCARDIOGRAM TRACING: CPT

## 2022-04-01 PROCEDURE — 96365 THER/PROPH/DIAG IV INF INIT: CPT

## 2022-04-01 PROCEDURE — 83690 ASSAY OF LIPASE: CPT | Performed by: EMERGENCY MEDICINE

## 2022-04-01 PROCEDURE — 85025 COMPLETE CBC W/AUTO DIFF WBC: CPT | Performed by: EMERGENCY MEDICINE

## 2022-04-01 PROCEDURE — 93010 ELECTROCARDIOGRAM REPORT: CPT

## 2022-04-01 PROCEDURE — 74177 CT ABD & PELVIS W/CONTRAST: CPT | Performed by: EMERGENCY MEDICINE

## 2022-04-01 PROCEDURE — 80053 COMPREHEN METABOLIC PANEL: CPT | Performed by: EMERGENCY MEDICINE

## 2022-04-01 PROCEDURE — 81001 URINALYSIS AUTO W/SCOPE: CPT | Performed by: EMERGENCY MEDICINE

## 2022-04-01 PROCEDURE — 96375 TX/PRO/DX INJ NEW DRUG ADDON: CPT

## 2022-04-01 PROCEDURE — 99285 EMERGENCY DEPT VISIT HI MDM: CPT

## 2022-04-01 PROCEDURE — S0028 INJECTION, FAMOTIDINE, 20 MG: HCPCS | Performed by: EMERGENCY MEDICINE

## 2022-04-01 PROCEDURE — 96376 TX/PRO/DX INJ SAME DRUG ADON: CPT

## 2022-04-01 PROCEDURE — 71045 X-RAY EXAM CHEST 1 VIEW: CPT | Performed by: EMERGENCY MEDICINE

## 2022-04-01 PROCEDURE — 96361 HYDRATE IV INFUSION ADD-ON: CPT

## 2022-04-01 RX ORDER — PROCHLORPERAZINE EDISYLATE 5 MG/ML
5 INJECTION INTRAMUSCULAR; INTRAVENOUS EVERY 8 HOURS PRN
Status: DISCONTINUED | OUTPATIENT
Start: 2022-04-01 | End: 2022-04-06

## 2022-04-01 RX ORDER — IOHEXOL 350 MG/ML
100 INJECTION, SOLUTION INTRAVENOUS
Status: COMPLETED | OUTPATIENT
Start: 2022-04-01 | End: 2022-04-01

## 2022-04-01 RX ORDER — HYDROCODONE BITARTRATE AND ACETAMINOPHEN 5; 325 MG/1; MG/1
2 TABLET ORAL EVERY 4 HOURS PRN
Status: DISCONTINUED | OUTPATIENT
Start: 2022-04-01 | End: 2022-04-06

## 2022-04-01 RX ORDER — HYDROMORPHONE HYDROCHLORIDE 1 MG/ML
0.4 INJECTION, SOLUTION INTRAMUSCULAR; INTRAVENOUS; SUBCUTANEOUS EVERY 2 HOUR PRN
Status: DISCONTINUED | OUTPATIENT
Start: 2022-04-01 | End: 2022-04-06

## 2022-04-01 RX ORDER — POTASSIUM CHLORIDE 20 MEQ/1
40 TABLET, EXTENDED RELEASE ORAL ONCE
Status: COMPLETED | OUTPATIENT
Start: 2022-04-01 | End: 2022-04-01

## 2022-04-01 RX ORDER — HYDROMORPHONE HYDROCHLORIDE 1 MG/ML
0.8 INJECTION, SOLUTION INTRAMUSCULAR; INTRAVENOUS; SUBCUTANEOUS EVERY 2 HOUR PRN
Status: DISCONTINUED | OUTPATIENT
Start: 2022-04-01 | End: 2022-04-06

## 2022-04-01 RX ORDER — FAMOTIDINE 10 MG/ML
20 INJECTION, SOLUTION INTRAVENOUS ONCE
Status: COMPLETED | OUTPATIENT
Start: 2022-04-01 | End: 2022-04-01

## 2022-04-01 RX ORDER — ONDANSETRON 2 MG/ML
4 INJECTION INTRAMUSCULAR; INTRAVENOUS ONCE
Status: COMPLETED | OUTPATIENT
Start: 2022-04-01 | End: 2022-04-01

## 2022-04-01 RX ORDER — SODIUM CHLORIDE 9 MG/ML
INJECTION, SOLUTION INTRAVENOUS CONTINUOUS
Status: DISCONTINUED | OUTPATIENT
Start: 2022-04-01 | End: 2022-04-06

## 2022-04-01 RX ORDER — HYDROMORPHONE HYDROCHLORIDE 1 MG/ML
0.5 INJECTION, SOLUTION INTRAMUSCULAR; INTRAVENOUS; SUBCUTANEOUS EVERY 30 MIN PRN
Status: DISCONTINUED | OUTPATIENT
Start: 2022-04-01 | End: 2022-04-01

## 2022-04-01 RX ORDER — ONDANSETRON 2 MG/ML
4 INJECTION INTRAMUSCULAR; INTRAVENOUS EVERY 6 HOURS PRN
Status: DISCONTINUED | OUTPATIENT
Start: 2022-04-01 | End: 2022-04-06

## 2022-04-01 RX ORDER — SERTRALINE HYDROCHLORIDE 100 MG/1
200 TABLET, FILM COATED ORAL NIGHTLY
Status: DISCONTINUED | OUTPATIENT
Start: 2022-04-01 | End: 2022-04-06

## 2022-04-01 RX ORDER — SODIUM CHLORIDE 9 MG/ML
INJECTION, SOLUTION INTRAVENOUS CONTINUOUS
Status: ACTIVE | OUTPATIENT
Start: 2022-04-01 | End: 2022-04-01

## 2022-04-01 RX ORDER — HYDROCODONE BITARTRATE AND ACETAMINOPHEN 5; 325 MG/1; MG/1
1 TABLET ORAL EVERY 4 HOURS PRN
Status: DISCONTINUED | OUTPATIENT
Start: 2022-04-01 | End: 2022-04-06

## 2022-04-01 RX ORDER — BUPROPION HYDROCHLORIDE 150 MG/1
150 TABLET ORAL EVERY MORNING
Status: DISCONTINUED | OUTPATIENT
Start: 2022-04-02 | End: 2022-04-06

## 2022-04-01 RX ORDER — ONDANSETRON 2 MG/ML
4 INJECTION INTRAMUSCULAR; INTRAVENOUS EVERY 4 HOURS PRN
Status: DISCONTINUED | OUTPATIENT
Start: 2022-04-01 | End: 2022-04-01

## 2022-04-01 RX ORDER — PANTOPRAZOLE SODIUM 40 MG/1
40 TABLET, DELAYED RELEASE ORAL
Refills: 5 | Status: DISCONTINUED | OUTPATIENT
Start: 2022-04-02 | End: 2022-04-06

## 2022-04-01 RX ORDER — ENOXAPARIN SODIUM 100 MG/ML
40 INJECTION SUBCUTANEOUS DAILY
Status: DISCONTINUED | OUTPATIENT
Start: 2022-04-02 | End: 2022-04-06

## 2022-04-01 RX ORDER — HYDROMORPHONE HYDROCHLORIDE 1 MG/ML
0.2 INJECTION, SOLUTION INTRAMUSCULAR; INTRAVENOUS; SUBCUTANEOUS EVERY 2 HOUR PRN
Status: DISCONTINUED | OUTPATIENT
Start: 2022-04-01 | End: 2022-04-06

## 2022-04-01 RX ORDER — ACETAMINOPHEN 325 MG/1
650 TABLET ORAL EVERY 4 HOURS PRN
Status: DISCONTINUED | OUTPATIENT
Start: 2022-04-01 | End: 2022-04-06

## 2022-04-01 NOTE — ED INITIAL ASSESSMENT (HPI)
Pt arrives with complaints of of a potential UC flare up. Pt states that she had food poisoning one week ago and her pain has steadily been worsening since to the point of being \"unbearable on and off\". Pt states that she has had a temp of 101 yesterday; afebrile upon arrival. Pt is experiencing diarrhea and mild nausea.

## 2022-04-02 LAB
ANION GAP SERPL CALC-SCNC: 7 MMOL/L (ref 0–18)
BUN BLD-MCNC: 7 MG/DL (ref 7–18)
CALCIUM BLD-MCNC: 9.1 MG/DL (ref 8.5–10.1)
CHLORIDE SERPL-SCNC: 108 MMOL/L (ref 98–112)
CO2 SERPL-SCNC: 26 MMOL/L (ref 21–32)
CREAT BLD-MCNC: 0.69 MG/DL
GLUCOSE BLD-MCNC: 109 MG/DL (ref 70–99)
MAGNESIUM SERPL-MCNC: 2.2 MG/DL (ref 1.6–2.6)
OSMOLALITY SERPL CALC.SUM OF ELEC: 291 MOSM/KG (ref 275–295)
POTASSIUM SERPL-SCNC: 3.3 MMOL/L (ref 3.5–5.1)
SODIUM SERPL-SCNC: 141 MMOL/L (ref 136–145)

## 2022-04-02 PROCEDURE — 83735 ASSAY OF MAGNESIUM: CPT | Performed by: HOSPITALIST

## 2022-04-02 PROCEDURE — 80048 BASIC METABOLIC PNL TOTAL CA: CPT | Performed by: HOSPITALIST

## 2022-04-02 RX ORDER — CLONAZEPAM 0.5 MG/1
0.5 TABLET ORAL 2 TIMES DAILY PRN
Status: DISCONTINUED | OUTPATIENT
Start: 2022-04-02 | End: 2022-04-06

## 2022-04-02 RX ORDER — CALCIUM CARBONATE 200(500)MG
500 TABLET,CHEWABLE ORAL 2 TIMES DAILY PRN
Status: DISCONTINUED | OUTPATIENT
Start: 2022-04-02 | End: 2022-04-06

## 2022-04-02 NOTE — PLAN OF CARE
Problem: Patient/Family Goals  Goal: Patient/Family Long Term Goal  Description: Patient's Long Term Goal: Discharge home    Interventions:  - IVF  -IV Abx  - See additional Care Plan goals for specific interventions  Outcome: Progressing  Goal: Patient/Family Short Term Goal  Description: Patient's Short Term Goal: Resolve acute pain    Interventions:   - Pain assessment   -Pain medication as needed  - See additional Care Plan goals for specific interventions  Outcome: Progressing     Problem: GASTROINTESTINAL - ADULT  Goal: Minimal or absence of nausea and vomiting  Description: INTERVENTIONS:  - Maintain adequate hydration with IV or PO as ordered and tolerated  - Nasogastric tube to low intermittent suction as ordered  - Evaluate effectiveness of ordered antiemetic medications  - Provide nonpharmacologic comfort measures as appropriate  - Advance diet as tolerated, if ordered  - Obtain nutritional consult as needed  - Evaluate fluid balance  Outcome: Progressing  Goal: Maintains adequate nutritional intake (undernourished)  Description: INTERVENTIONS:  - Monitor percentage of each meal consumed  - Identify factors contributing to decreased intake, treat as appropriate  - Assist with meals as needed  - Monitor I&O, WT and lab values  - Obtain nutritional consult as needed  - Optimize oral hygiene and moisture  - Encourage food from home; allow for food preferences  - Enhance eating environment  Outcome: Progressing     Problem: PAIN - ADULT  Goal: Verbalizes/displays adequate comfort level or patient's stated pain goal  Description: INTERVENTIONS:  - Encourage pt to monitor pain and request assistance  - Assess pain using appropriate pain scale  - Administer analgesics based on type and severity of pain and evaluate response  - Implement non-pharmacological measures as appropriate and evaluate response  - Consider cultural and social influences on pain and pain management  - Manage/alleviate anxiety  - Utilize distraction and/or relaxation techniques  - Monitor for opioid side effects  - Notify MD/LIP if interventions unsuccessful or patient reports new pain  - Anticipate increased pain with activity and pre-medicate as appropriate  Outcome: Progressing     Problem: RISK FOR INFECTION - ADULT  Goal: Absence of fever/infection during anticipated neutropenic period  Description: INTERVENTIONS  - Monitor WBC  - Administer growth factors as ordered  - Implement neutropenic guidelines  Outcome: Progressing     Problem: SAFETY ADULT - FALL  Goal: Free from fall injury  Description: INTERVENTIONS:  - Assess pt frequently for physical needs  - Identify cognitive and physical deficits and behaviors that affect risk of falls.   - [place] fall precautions as indicated by assessment.  - Educate pt/family on patient safety including physical limitations  - Instruct pt to call for assistance with activity based on assessment  - Modify environment to reduce risk of injury  - Provide assistive devices as appropriate  - Consider OT/PT consult to assist with strengthening/mobility  - Encourage toileting schedule  Outcome: Progressing

## 2022-04-02 NOTE — PROGRESS NOTES
2105:       NURSING ADMISSION NOTE      Patient admitted via Wheelchair  Oriented to room. Safety precautions initiated. Bed in low position. Call light in reach.     Pt is alert and oriented, VSS, RA, Tele-NSR, NPO, IVF infusing, pt denies N/V, C/O  abd pain, being managed per mar, pt updated on the poc, safety precautions in place, call light at bedside, will keep monitoring

## 2022-04-02 NOTE — ED QUICK NOTES
Orders for admission, patient is aware of plan and ready to go upstairs. Any questions, please call ED DAISHA Mueller at extension [phone]. Vaccinated?  yes  Type of COVID test sent:rapid  COVID Suspicion level: Low      Titratable drug(s) infusing:n/a  Rate:n/a    LOC at time of transport:alert and oriented x 4    Other pertinent information:n/a    CIWA score=n/a  NIH score=n/a

## 2022-04-02 NOTE — PLAN OF CARE
Problem: Patient/Family Goals  Goal: Patient/Family Long Term Goal  Description: Patient's Long Term Goal: Discharge home    Interventions:  - IVF  -IV Abx  - See additional Care Plan goals for specific interventions  Outcome: Progressing  Goal: Patient/Family Short Term Goal  Description: Patient's Short Term Goal: Resolve acute pain    Interventions:   - Pain assessment   -Pain medication as needed  - See additional Care Plan goals for specific interventions  Outcome: Progressing     Problem: GASTROINTESTINAL - ADULT  Goal: Minimal or absence of nausea and vomiting  Description: INTERVENTIONS:  - Maintain adequate hydration with IV or PO as ordered and tolerated  - Nasogastric tube to low intermittent suction as ordered  - Evaluate effectiveness of ordered antiemetic medications  - Provide nonpharmacologic comfort measures as appropriate  - Advance diet as tolerated, if ordered  - Obtain nutritional consult as needed  - Evaluate fluid balance  Outcome: Progressing  Goal: Maintains adequate nutritional intake (undernourished)  Description: INTERVENTIONS:  - Monitor percentage of each meal consumed  - Identify factors contributing to decreased intake, treat as appropriate  - Assist with meals as needed  - Monitor I&O, WT and lab values  - Obtain nutritional consult as needed  - Optimize oral hygiene and moisture  - Encourage food from home; allow for food preferences  - Enhance eating environment  Outcome: Progressing     Problem: PAIN - ADULT  Goal: Verbalizes/displays adequate comfort level or patient's stated pain goal  Description: INTERVENTIONS:  - Encourage pt to monitor pain and request assistance  - Assess pain using appropriate pain scale  - Administer analgesics based on type and severity of pain and evaluate response  - Implement non-pharmacological measures as appropriate and evaluate response  - Consider cultural and social influences on pain and pain management  - Manage/alleviate anxiety  - Utilize distraction and/or relaxation techniques  - Monitor for opioid side effects  - Notify MD/LIP if interventions unsuccessful or patient reports new pain  - Anticipate increased pain with activity and pre-medicate as appropriate  Outcome: Progressing     Problem: RISK FOR INFECTION - ADULT  Goal: Absence of fever/infection during anticipated neutropenic period  Description: INTERVENTIONS  - Monitor WBC  - Administer growth factors as ordered  - Implement neutropenic guidelines  Outcome: Progressing     Problem: SAFETY ADULT - FALL  Goal: Free from fall injury  Description: INTERVENTIONS:  - Assess pt frequently for physical needs  - Identify cognitive and physical deficits and behaviors that affect risk of falls. - [place] fall precautions as indicated by assessment.  - Educate pt/family on patient safety including physical limitations  - Instruct pt to call for assistance with activity based on assessment  - Modify environment to reduce risk of injury  - Provide assistive devices as appropriate  - Consider OT/PT consult to assist with strengthening/mobility  - Encourage toileting schedule  Outcome: Progressing     A&Ox4. VSS. RA. . Telemetry: NSR  GI: Abdomen soft, nondistended. Passing gas. Denies nausea. : Voids. Pain controlled with PRN pain medications  Up independently  Diet:NPO  IVF running per order. All appropriate safety measures in place. All questions and concerns addressed.  Will continue to monitor

## 2022-04-03 LAB — POTASSIUM SERPL-SCNC: 3.6 MMOL/L (ref 3.5–5.1)

## 2022-04-03 PROCEDURE — S0028 INJECTION, FAMOTIDINE, 20 MG: HCPCS | Performed by: HOSPITALIST

## 2022-04-03 PROCEDURE — 84132 ASSAY OF SERUM POTASSIUM: CPT | Performed by: INTERNAL MEDICINE

## 2022-04-03 RX ORDER — FAMOTIDINE 10 MG/ML
20 INJECTION, SOLUTION INTRAVENOUS 2 TIMES DAILY
Status: DISCONTINUED | OUTPATIENT
Start: 2022-04-03 | End: 2022-04-06

## 2022-04-03 NOTE — PLAN OF CARE
A&Ox4. VSS. RA. . Denies chest pain and SOB. Patient anxious--PRN klonopin given. Telemetry: NSR. GI: Abdomen soft, nondistended, tender. Passing gas. Denies nausea. : Voids. Pain medication given PRN for pain. Up ad thierry. Diet: NPO with sips with medication. IVF running per order. All appropriate safety measures in place. All questions and concerns addressed. Will continue to monitor.      Problem: Patient/Family Goals  Goal: Patient/Family Long Term Goal  Description: Patient's Long Term Goal: Discharge home    Interventions:  - IVF  -IV Abx  - See additional Care Plan goals for specific interventions  Outcome: Progressing  Goal: Patient/Family Short Term Goal  Description: Patient's Short Term Goal: Resolve acute pain    Interventions:   - Pain assessment   -Pain medication as needed  - See additional Care Plan goals for specific interventions  Outcome: Progressing     Problem: GASTROINTESTINAL - ADULT  Goal: Minimal or absence of nausea and vomiting  Description: INTERVENTIONS:  - Maintain adequate hydration with IV or PO as ordered and tolerated  - Nasogastric tube to low intermittent suction as ordered  - Evaluate effectiveness of ordered antiemetic medications  - Provide nonpharmacologic comfort measures as appropriate  - Advance diet as tolerated, if ordered  - Obtain nutritional consult as needed  - Evaluate fluid balance  Outcome: Progressing  Goal: Maintains adequate nutritional intake (undernourished)  Description: INTERVENTIONS:  - Monitor percentage of each meal consumed  - Identify factors contributing to decreased intake, treat as appropriate  - Assist with meals as needed  - Monitor I&O, WT and lab values  - Obtain nutritional consult as needed  - Optimize oral hygiene and moisture  - Encourage food from home; allow for food preferences  - Enhance eating environment  Outcome: Progressing     Problem: PAIN - ADULT  Goal: Verbalizes/displays adequate comfort level or patient's stated pain goal  Description: INTERVENTIONS:  - Encourage pt to monitor pain and request assistance  - Assess pain using appropriate pain scale  - Administer analgesics based on type and severity of pain and evaluate response  - Implement non-pharmacological measures as appropriate and evaluate response  - Consider cultural and social influences on pain and pain management  - Manage/alleviate anxiety  - Utilize distraction and/or relaxation techniques  - Monitor for opioid side effects  - Notify MD/LIP if interventions unsuccessful or patient reports new pain  - Anticipate increased pain with activity and pre-medicate as appropriate  Outcome: Progressing     Problem: RISK FOR INFECTION - ADULT  Goal: Absence of fever/infection during anticipated neutropenic period  Description: INTERVENTIONS  - Monitor WBC  - Administer growth factors as ordered  - Implement neutropenic guidelines  Outcome: Progressing     Problem: SAFETY ADULT - FALL  Goal: Free from fall injury  Description: INTERVENTIONS:  - Assess pt frequently for physical needs  - Identify cognitive and physical deficits and behaviors that affect risk of falls.   - [place] fall precautions as indicated by assessment.  - Educate pt/family on patient safety including physical limitations  - Instruct pt to call for assistance with activity based on assessment  - Modify environment to reduce risk of injury  - Provide assistive devices as appropriate  - Consider OT/PT consult to assist with strengthening/mobility  - Encourage toileting schedule  Outcome: Progressing

## 2022-04-03 NOTE — PLAN OF CARE
Problem: Patient/Family Goals  Goal: Patient/Family Long Term Goal  Description: Patient's Long Term Goal: Discharge home    Interventions:  - IVF  -IV Abx  - See additional Care Plan goals for specific interventions  Outcome: Progressing  Goal: Patient/Family Short Term Goal  Description: Patient's Short Term Goal: Resolve acute pain    Interventions:   - Pain assessment   -Pain medication as needed  - See additional Care Plan goals for specific interventions  Outcome: Progressing     Problem: GASTROINTESTINAL - ADULT  Goal: Minimal or absence of nausea and vomiting  Description: INTERVENTIONS:  - Maintain adequate hydration with IV or PO as ordered and tolerated  - Nasogastric tube to low intermittent suction as ordered  - Evaluate effectiveness of ordered antiemetic medications  - Provide nonpharmacologic comfort measures as appropriate  - Advance diet as tolerated, if ordered  - Obtain nutritional consult as needed  - Evaluate fluid balance  Outcome: Progressing  Goal: Maintains adequate nutritional intake (undernourished)  Description: INTERVENTIONS:  - Monitor percentage of each meal consumed  - Identify factors contributing to decreased intake, treat as appropriate  - Assist with meals as needed  - Monitor I&O, WT and lab values  - Obtain nutritional consult as needed  - Optimize oral hygiene and moisture  - Encourage food from home; allow for food preferences  - Enhance eating environment  Outcome: Progressing     Problem: PAIN - ADULT  Goal: Verbalizes/displays adequate comfort level or patient's stated pain goal  Description: INTERVENTIONS:  - Encourage pt to monitor pain and request assistance  - Assess pain using appropriate pain scale  - Administer analgesics based on type and severity of pain and evaluate response  - Implement non-pharmacological measures as appropriate and evaluate response  - Consider cultural and social influences on pain and pain management  - Manage/alleviate anxiety  - Utilize distraction and/or relaxation techniques  - Monitor for opioid side effects  - Notify MD/LIP if interventions unsuccessful or patient reports new pain  - Anticipate increased pain with activity and pre-medicate as appropriate  Outcome: Progressing     Problem: RISK FOR INFECTION - ADULT  Goal: Absence of fever/infection during anticipated neutropenic period  Description: INTERVENTIONS  - Monitor WBC  - Administer growth factors as ordered  - Implement neutropenic guidelines  Outcome: Progressing     Problem: SAFETY ADULT - FALL  Goal: Free from fall injury  Description: INTERVENTIONS:  - Assess pt frequently for physical needs  - Identify cognitive and physical deficits and behaviors that affect risk of falls. - [place] fall precautions as indicated by assessment.  - Educate pt/family on patient safety including physical limitations  - Instruct pt to call for assistance with activity based on assessment  - Modify environment to reduce risk of injury  - Provide assistive devices as appropriate  - Consider OT/PT consult to assist with strengthening/mobility  - Encourage toileting schedule  Outcome: Progressing   Pt is alert and oriented x4. NSR on tele. Maintaining o2 sats on r/a. GUZMAN no cpap. Lovenox and scd's. Pt is voiding freely. C/o abdominal pain, prn medication given. IVF and abx as ordered. All questions and concerns addressed at this time. Will continue to monitor.

## 2022-04-04 LAB
ANION GAP SERPL CALC-SCNC: 1 MMOL/L (ref 0–18)
BUN BLD-MCNC: 3 MG/DL (ref 7–18)
CALCIUM BLD-MCNC: 8.7 MG/DL (ref 8.5–10.1)
CHLORIDE SERPL-SCNC: 112 MMOL/L (ref 98–112)
CO2 SERPL-SCNC: 27 MMOL/L (ref 21–32)
CREAT BLD-MCNC: 0.65 MG/DL
ERYTHROCYTE [DISTWIDTH] IN BLOOD BY AUTOMATED COUNT: 13.2 %
GLUCOSE BLD-MCNC: 97 MG/DL (ref 70–99)
HCT VFR BLD AUTO: 38.4 %
HGB BLD-MCNC: 12.2 G/DL
MAGNESIUM SERPL-MCNC: 2.1 MG/DL (ref 1.6–2.6)
MCH RBC QN AUTO: 29.4 PG (ref 26–34)
MCHC RBC AUTO-ENTMCNC: 31.8 G/DL (ref 31–37)
MCV RBC AUTO: 92.5 FL
OSMOLALITY SERPL CALC.SUM OF ELEC: 286 MOSM/KG (ref 275–295)
PLATELET # BLD AUTO: 258 10(3)UL (ref 150–450)
POTASSIUM SERPL-SCNC: 3.3 MMOL/L (ref 3.5–5.1)
RBC # BLD AUTO: 4.15 X10(6)UL
SODIUM SERPL-SCNC: 140 MMOL/L (ref 136–145)
WBC # BLD AUTO: 9.5 X10(3) UL (ref 4–11)

## 2022-04-04 PROCEDURE — 83735 ASSAY OF MAGNESIUM: CPT | Performed by: INTERNAL MEDICINE

## 2022-04-04 PROCEDURE — S0028 INJECTION, FAMOTIDINE, 20 MG: HCPCS | Performed by: HOSPITALIST

## 2022-04-04 PROCEDURE — 85027 COMPLETE CBC AUTOMATED: CPT | Performed by: INTERNAL MEDICINE

## 2022-04-04 PROCEDURE — 80048 BASIC METABOLIC PNL TOTAL CA: CPT | Performed by: INTERNAL MEDICINE

## 2022-04-04 RX ORDER — POTASSIUM CHLORIDE 20 MEQ/1
40 TABLET, EXTENDED RELEASE ORAL ONCE
Status: COMPLETED | OUTPATIENT
Start: 2022-04-04 | End: 2022-04-04

## 2022-04-04 NOTE — PLAN OF CARE
A&Ox4. VSS. RA. . Denies chest pain and SOB. Patient anxious--reports being less anxious than yesterday. Patient open to psych liason consult. Telemetry: NSR. GI: Abdomen soft, nondistended, tender. Passing gas. Denies nausea. : Voids. Pain well controlled--reports pain being much better today than yesterday. Up ad thierry. Diet: Moved to full liquid diet from clears. IVF running per order. All appropriate safety measures in place. All questions and concerns addressed. Will continue to monitor.         Problem: Patient/Family Goals  Goal: Patient/Family Long Term Goal  Description: Patient's Long Term Goal: Discharge home    Interventions:  - IVF  -IV Abx  - See additional Care Plan goals for specific interventions  Outcome: Progressing  Goal: Patient/Family Short Term Goal  Description: Patient's Short Term Goal: Resolve acute pain    Interventions:   - Pain assessment   -Pain medication as needed  - See additional Care Plan goals for specific interventions  Outcome: Progressing     Problem: GASTROINTESTINAL - ADULT  Goal: Minimal or absence of nausea and vomiting  Description: INTERVENTIONS:  - Maintain adequate hydration with IV or PO as ordered and tolerated  - Nasogastric tube to low intermittent suction as ordered  - Evaluate effectiveness of ordered antiemetic medications  - Provide nonpharmacologic comfort measures as appropriate  - Advance diet as tolerated, if ordered  - Obtain nutritional consult as needed  - Evaluate fluid balance  Outcome: Progressing  Goal: Maintains adequate nutritional intake (undernourished)  Description: INTERVENTIONS:  - Monitor percentage of each meal consumed  - Identify factors contributing to decreased intake, treat as appropriate  - Assist with meals as needed  - Monitor I&O, WT and lab values  - Obtain nutritional consult as needed  - Optimize oral hygiene and moisture  - Encourage food from home; allow for food preferences  - Enhance eating environment  Outcome: Progressing     Problem: PAIN - ADULT  Goal: Verbalizes/displays adequate comfort level or patient's stated pain goal  Description: INTERVENTIONS:  - Encourage pt to monitor pain and request assistance  - Assess pain using appropriate pain scale  - Administer analgesics based on type and severity of pain and evaluate response  - Implement non-pharmacological measures as appropriate and evaluate response  - Consider cultural and social influences on pain and pain management  - Manage/alleviate anxiety  - Utilize distraction and/or relaxation techniques  - Monitor for opioid side effects  - Notify MD/LIP if interventions unsuccessful or patient reports new pain  - Anticipate increased pain with activity and pre-medicate as appropriate  Outcome: Progressing     Problem: RISK FOR INFECTION - ADULT  Goal: Absence of fever/infection during anticipated neutropenic period  Description: INTERVENTIONS  - Monitor WBC  - Administer growth factors as ordered  - Implement neutropenic guidelines  Outcome: Progressing     Problem: SAFETY ADULT - FALL  Goal: Free from fall injury  Description: INTERVENTIONS:  - Assess pt frequently for physical needs  - Identify cognitive and physical deficits and behaviors that affect risk of falls.   - [place] fall precautions as indicated by assessment.  - Educate pt/family on patient safety including physical limitations  - Instruct pt to call for assistance with activity based on assessment  - Modify environment to reduce risk of injury  - Provide assistive devices as appropriate  - Consider OT/PT consult to assist with strengthening/mobility  - Encourage toileting schedule  Outcome: Progressing

## 2022-04-04 NOTE — BH PROGRESS NOTE
[facility] Resource Referral Counselor Note    Jc Luke Patient Status:  Inpatient    1970 MRN [id number]   [facility] 3NW-A Attending Stephanie Brown MD   Hosp Day # 3 PCP Kennith Apley, MD       S(subjective) Patient admits to a history of anxiety and depression. She states she was seeing a psychiatrist with [facility], Dr. Bryan Murphy.  Her last visit was on 22 for medication management. The patient states she quit her job therefore she doesn't have insurance. She states her anxiety since being in the hospital has increased. She admits to taking per psychiatrist, Wellbutrin, Zoloft and Clonopin. Zahra Lu denies any suicidal thoughts. O(objective) She is alert and oriented x4. Her mood and affect is wnl. A(assessment) The phq9 was completed. P(plan) The patient states she is planning on going back to see Dr. Bryan Murphy when she has insurance again. Until that time she was recommended to follow up with her pcp or go see a psychiatrist and psychotherapist with [facility]. These referrals will be placed in the discharge section of this record.       Ashwini Nance RN  2022  3:40 PM

## 2022-04-04 NOTE — PLAN OF CARE
Pt vitals stable, A&O x4. Continuous pulse ox and telemetry in place. Pt visibly anxious. Pt denied chest pain, shortness of breath, and calf pain. Tolerating diet well. Claims heartburn is calmer at this time. Bed locked in low position, call light in reach, rounding provided.        Problem: Patient/Family Goals  Goal: Patient/Family Long Term Goal  Description: Patient's Long Term Goal: Discharge home    Interventions:  - IVF  -IV Abx  - See additional Care Plan goals for specific interventions  Outcome: Progressing  Goal: Patient/Family Short Term Goal  Description: Patient's Short Term Goal: Resolve acute pain    Interventions:   - Pain assessment   -Pain medication as needed  - See additional Care Plan goals for specific interventions  Outcome: Progressing     Problem: GASTROINTESTINAL - ADULT  Goal: Minimal or absence of nausea and vomiting  Description: INTERVENTIONS:  - Maintain adequate hydration with IV or PO as ordered and tolerated  - Nasogastric tube to low intermittent suction as ordered  - Evaluate effectiveness of ordered antiemetic medications  - Provide nonpharmacologic comfort measures as appropriate  - Advance diet as tolerated, if ordered  - Obtain nutritional consult as needed  - Evaluate fluid balance  Outcome: Progressing  Goal: Maintains adequate nutritional intake (undernourished)  Description: INTERVENTIONS:  - Monitor percentage of each meal consumed  - Identify factors contributing to decreased intake, treat as appropriate  - Assist with meals as needed  - Monitor I&O, WT and lab values  - Obtain nutritional consult as needed  - Optimize oral hygiene and moisture  - Encourage food from home; allow for food preferences  - Enhance eating environment  Outcome: Progressing     Problem: PAIN - ADULT  Goal: Verbalizes/displays adequate comfort level or patient's stated pain goal  Description: INTERVENTIONS:  - Encourage pt to monitor pain and request assistance  - Assess pain using appropriate pain scale  - Administer analgesics based on type and severity of pain and evaluate response  - Implement non-pharmacological measures as appropriate and evaluate response  - Consider cultural and social influences on pain and pain management  - Manage/alleviate anxiety  - Utilize distraction and/or relaxation techniques  - Monitor for opioid side effects  - Notify MD/LIP if interventions unsuccessful or patient reports new pain  - Anticipate increased pain with activity and pre-medicate as appropriate  Outcome: Progressing     Problem: RISK FOR INFECTION - ADULT  Goal: Absence of fever/infection during anticipated neutropenic period  Description: INTERVENTIONS  - Monitor WBC  - Administer growth factors as ordered  - Implement neutropenic guidelines  Outcome: Progressing     Problem: SAFETY ADULT - FALL  Goal: Free from fall injury  Description: INTERVENTIONS:  - Assess pt frequently for physical needs  - Identify cognitive and physical deficits and behaviors that affect risk of falls.   - [place] fall precautions as indicated by assessment.  - Educate pt/family on patient safety including physical limitations  - Instruct pt to call for assistance with activity based on assessment  - Modify environment to reduce risk of injury  - Provide assistive devices as appropriate  - Consider OT/PT consult to assist with strengthening/mobility  - Encourage toileting schedule  Outcome: Progressing

## 2022-04-05 LAB
C DIFF TOX B STL QL: POSITIVE
POTASSIUM SERPL-SCNC: 3.3 MMOL/L (ref 3.5–5.1)
POTASSIUM SERPL-SCNC: 3.8 MMOL/L (ref 3.5–5.1)

## 2022-04-05 PROCEDURE — S0028 INJECTION, FAMOTIDINE, 20 MG: HCPCS | Performed by: HOSPITALIST

## 2022-04-05 PROCEDURE — 84132 ASSAY OF SERUM POTASSIUM: CPT | Performed by: INTERNAL MEDICINE

## 2022-04-05 PROCEDURE — 87493 C DIFF AMPLIFIED PROBE: CPT | Performed by: INTERNAL MEDICINE

## 2022-04-05 RX ORDER — POTASSIUM CHLORIDE 20 MEQ/1
40 TABLET, EXTENDED RELEASE ORAL EVERY 4 HOURS
Status: COMPLETED | OUTPATIENT
Start: 2022-04-05 | End: 2022-04-05

## 2022-04-05 RX ORDER — POTASSIUM CHLORIDE 20 MEQ/1
40 TABLET, EXTENDED RELEASE ORAL
Status: DISCONTINUED | OUTPATIENT
Start: 2022-04-05 | End: 2022-04-05

## 2022-04-05 RX ORDER — AMOXICILLIN AND CLAVULANATE POTASSIUM 875; 125 MG/1; MG/1
1 TABLET, FILM COATED ORAL 2 TIMES DAILY
Qty: 20 TABLET | Refills: 0 | Status: SHIPPED | OUTPATIENT
Start: 2022-04-05 | End: 2022-04-06

## 2022-04-05 RX ORDER — VANCOMYCIN HYDROCHLORIDE 125 MG/1
125 CAPSULE ORAL EVERY 6 HOURS
Status: DISCONTINUED | OUTPATIENT
Start: 2022-04-05 | End: 2022-04-06

## 2022-04-05 NOTE — PLAN OF CARE
A & O X4, on room air, oxygenating well, VSS, Afebrile, NSR on tele, po pain management as prescribes, no BM as of this time, will need to send stool to rule C-diff. IVF continues, IV abt tolerating. Tolerating a full liquid diet. Ambulates independently.   Problem: Patient/Family Goals  Goal: Patient/Family Long Term Goal  Description: Patient's Long Term Goal: Discharge home    Interventions:  - IVF  -IV Abx  - See additional Care Plan goals for specific interventions  Outcome: Progressing  Goal: Patient/Family Short Term Goal  Description: Patient's Short Term Goal: Resolve acute pain    Interventions:   - Pain assessment   -Pain medication as needed  - See additional Care Plan goals for specific interventions  Outcome: Progressing     Problem: GASTROINTESTINAL - ADULT  Goal: Minimal or absence of nausea and vomiting  Description: INTERVENTIONS:  - Maintain adequate hydration with IV or PO as ordered and tolerated  - Nasogastric tube to low intermittent suction as ordered  - Evaluate effectiveness of ordered antiemetic medications  - Provide nonpharmacologic comfort measures as appropriate  - Advance diet as tolerated, if ordered  - Obtain nutritional consult as needed  - Evaluate fluid balance  Outcome: Progressing  Goal: Maintains adequate nutritional intake (undernourished)  Description: INTERVENTIONS:  - Monitor percentage of each meal consumed  - Identify factors contributing to decreased intake, treat as appropriate  - Assist with meals as needed  - Monitor I&O, WT and lab values  - Obtain nutritional consult as needed  - Optimize oral hygiene and moisture  - Encourage food from home; allow for food preferences  - Enhance eating environment  Outcome: Progressing     Problem: PAIN - ADULT  Goal: Verbalizes/displays adequate comfort level or patient's stated pain goal  Description: INTERVENTIONS:  - Encourage pt to monitor pain and request assistance  - Assess pain using appropriate pain scale  - Administer analgesics based on type and severity of pain and evaluate response  - Implement non-pharmacological measures as appropriate and evaluate response  - Consider cultural and social influences on pain and pain management  - Manage/alleviate anxiety  - Utilize distraction and/or relaxation techniques  - Monitor for opioid side effects  - Notify MD/LIP if interventions unsuccessful or patient reports new pain  - Anticipate increased pain with activity and pre-medicate as appropriate  Outcome: Progressing     Problem: RISK FOR INFECTION - ADULT  Goal: Absence of fever/infection during anticipated neutropenic period  Description: INTERVENTIONS  - Monitor WBC  - Administer growth factors as ordered  - Implement neutropenic guidelines  Outcome: Progressing     Problem: SAFETY ADULT - FALL  Goal: Free from fall injury  Description: INTERVENTIONS:  - Assess pt frequently for physical needs  - Identify cognitive and physical deficits and behaviors that affect risk of falls.   - [place] fall precautions as indicated by assessment.  - Educate pt/family on patient safety including physical limitations  - Instruct pt to call for assistance with activity based on assessment  - Modify environment to reduce risk of injury  - Provide assistive devices as appropriate  - Consider OT/PT consult to assist with strengthening/mobility  - Encourage toileting schedule  Outcome: Progressing

## 2022-04-05 NOTE — PLAN OF CARE
Pt A&Ox4. RA. Pt having abd pain after eating low fiber meal, PRN pain + antiemetic meds given. POC discussed with Hospitalist and pt. Safety precautions in place call light in reach.          Problem: Patient/Family Goals  Goal: Patient/Family Long Term Goal  Description: Patient's Long Term Goal: Discharge home    Interventions:  - IVF  -IV Abx  - See additional Care Plan goals for specific interventions  Outcome: Progressing  Goal: Patient/Family Short Term Goal  Description: Patient's Short Term Goal: Resolve acute pain    Interventions:   - Pain assessment   -Pain medication as needed  - See additional Care Plan goals for specific interventions  Outcome: Progressing     Problem: GASTROINTESTINAL - ADULT  Goal: Minimal or absence of nausea and vomiting  Description: INTERVENTIONS:  - Maintain adequate hydration with IV or PO as ordered and tolerated  - Nasogastric tube to low intermittent suction as ordered  - Evaluate effectiveness of ordered antiemetic medications  - Provide nonpharmacologic comfort measures as appropriate  - Advance diet as tolerated, if ordered  - Obtain nutritional consult as needed  - Evaluate fluid balance  Outcome: Progressing  Goal: Maintains adequate nutritional intake (undernourished)  Description: INTERVENTIONS:  - Monitor percentage of each meal consumed  - Identify factors contributing to decreased intake, treat as appropriate  - Assist with meals as needed  - Monitor I&O, WT and lab values  - Obtain nutritional consult as needed  - Optimize oral hygiene and moisture  - Encourage food from home; allow for food preferences  - Enhance eating environment  Outcome: Progressing     Problem: PAIN - ADULT  Goal: Verbalizes/displays adequate comfort level or patient's stated pain goal  Description: INTERVENTIONS:  - Encourage pt to monitor pain and request assistance  - Assess pain using appropriate pain scale  - Administer analgesics based on type and severity of pain and evaluate response  - Implement non-pharmacological measures as appropriate and evaluate response  - Consider cultural and social influences on pain and pain management  - Manage/alleviate anxiety  - Utilize distraction and/or relaxation techniques  - Monitor for opioid side effects  - Notify MD/LIP if interventions unsuccessful or patient reports new pain  - Anticipate increased pain with activity and pre-medicate as appropriate  Outcome: Progressing     Problem: RISK FOR INFECTION - ADULT  Goal: Absence of fever/infection during anticipated neutropenic period  Description: INTERVENTIONS  - Monitor WBC  - Administer growth factors as ordered  - Implement neutropenic guidelines  Outcome: Progressing     Problem: SAFETY ADULT - FALL  Goal: Free from fall injury  Description: INTERVENTIONS:  - Assess pt frequently for physical needs  - Identify cognitive and physical deficits and behaviors that affect risk of falls.   - [place] fall precautions as indicated by assessment.  - Educate pt/family on patient safety including physical limitations  - Instruct pt to call for assistance with activity based on assessment  - Modify environment to reduce risk of injury  - Provide assistive devices as appropriate  - Consider OT/PT consult to assist with strengthening/mobility  - Encourage toileting schedule  Outcome: Progressing

## 2022-04-06 VITALS
BODY MASS INDEX: 39.99 KG/M2 | OXYGEN SATURATION: 97 % | RESPIRATION RATE: 18 BRPM | TEMPERATURE: 98 F | HEIGHT: 65 IN | WEIGHT: 240 LBS | DIASTOLIC BLOOD PRESSURE: 72 MMHG | HEART RATE: 62 BPM | SYSTOLIC BLOOD PRESSURE: 114 MMHG

## 2022-04-06 LAB
ATRIAL RATE: 77 BPM
P AXIS: 32 DEGREES
P-R INTERVAL: 148 MS
Q-T INTERVAL: 392 MS
QRS DURATION: 84 MS
QTC CALCULATION (BEZET): 443 MS
R AXIS: 19 DEGREES
T AXIS: -27 DEGREES
VENTRICULAR RATE: 77 BPM

## 2022-04-06 PROCEDURE — S0028 INJECTION, FAMOTIDINE, 20 MG: HCPCS | Performed by: HOSPITALIST

## 2022-04-06 RX ORDER — AMOXICILLIN AND CLAVULANATE POTASSIUM 875; 125 MG/1; MG/1
1 TABLET, FILM COATED ORAL 2 TIMES DAILY
Qty: 10 TABLET | Refills: 0 | Status: SHIPPED | OUTPATIENT
Start: 2022-04-06 | End: 2022-04-11

## 2022-04-06 RX ORDER — VANCOMYCIN HYDROCHLORIDE 125 MG/1
125 CAPSULE ORAL EVERY 6 HOURS
Qty: 48 CAPSULE | Refills: 0 | Status: SHIPPED | OUTPATIENT
Start: 2022-04-06 | End: 2022-04-18

## 2022-04-06 NOTE — PLAN OF CARE
A&O x4. Pt reports anxiety is better today. Pt having abdominal pain, norco given. Pt on RA, NSR via tele. Pt tolerating liquids. IVF and iv abx infusing per mar. Poc discussed, pt verbalized understanding. Pt resting in bed call light within reach.      Problem: Patient/Family Goals  Goal: Patient/Family Long Term Goal  Description: Patient's Long Term Goal: Discharge home    Interventions:  - IVF  -IV Abx  - See additional Care Plan goals for specific interventions  Outcome: Progressing  Goal: Patient/Family Short Term Goal  Description: Patient's Short Term Goal: Resolve acute pain    Interventions:   - Pain assessment   -Pain medication as needed  - See additional Care Plan goals for specific interventions  Outcome: Progressing     Problem: GASTROINTESTINAL - ADULT  Goal: Minimal or absence of nausea and vomiting  Description: INTERVENTIONS:  - Maintain adequate hydration with IV or PO as ordered and tolerated  - Nasogastric tube to low intermittent suction as ordered  - Evaluate effectiveness of ordered antiemetic medications  - Provide nonpharmacologic comfort measures as appropriate  - Advance diet as tolerated, if ordered  - Obtain nutritional consult as needed  - Evaluate fluid balance  Outcome: Progressing  Goal: Maintains adequate nutritional intake (undernourished)  Description: INTERVENTIONS:  - Monitor percentage of each meal consumed  - Identify factors contributing to decreased intake, treat as appropriate  - Assist with meals as needed  - Monitor I&O, WT and lab values  - Obtain nutritional consult as needed  - Optimize oral hygiene and moisture  - Encourage food from home; allow for food preferences  - Enhance eating environment  Outcome: Progressing     Problem: PAIN - ADULT  Goal: Verbalizes/displays adequate comfort level or patient's stated pain goal  Description: INTERVENTIONS:  - Encourage pt to monitor pain and request assistance  - Assess pain using appropriate pain scale  - Administer analgesics based on type and severity of pain and evaluate response  - Implement non-pharmacological measures as appropriate and evaluate response  - Consider cultural and social influences on pain and pain management  - Manage/alleviate anxiety  - Utilize distraction and/or relaxation techniques  - Monitor for opioid side effects  - Notify MD/LIP if interventions unsuccessful or patient reports new pain  - Anticipate increased pain with activity and pre-medicate as appropriate  Outcome: Progressing     Problem: RISK FOR INFECTION - ADULT  Goal: Absence of fever/infection during anticipated neutropenic period  Description: INTERVENTIONS  - Monitor WBC  - Administer growth factors as ordered  - Implement neutropenic guidelines  Outcome: Progressing     Problem: SAFETY ADULT - FALL  Goal: Free from fall injury  Description: INTERVENTIONS:  - Assess pt frequently for physical needs  - Identify cognitive and physical deficits and behaviors that affect risk of falls.   - [place] fall precautions as indicated by assessment.  - Educate pt/family on patient safety including physical limitations  - Instruct pt to call for assistance with activity based on assessment  - Modify environment to reduce risk of injury  - Provide assistive devices as appropriate  - Consider OT/PT consult to assist with strengthening/mobility  - Encourage toileting schedule  Outcome: Progressing

## 2022-04-06 NOTE — PLAN OF CARE
Pt was able to tolerate fl diet today. Abd soft and tender. Bs active,reports decrease in diarrhea. Will advance to soft. Poc updated, pt verbalized understanding.   Problem: GASTROINTESTINAL - ADULT  Goal: Minimal or absence of nausea and vomiting  Description: INTERVENTIONS:  - Maintain adequate hydration with IV or PO as ordered and tolerated  - Nasogastric tube to low intermittent suction as ordered  - Evaluate effectiveness of ordered antiemetic medications  - Provide nonpharmacologic comfort measures as appropriate  - Advance diet as tolerated, if ordered  - Obtain nutritional consult as needed  - Evaluate fluid balance  Outcome: Progressing  Goal: Maintains adequate nutritional intake (undernourished)  Description: INTERVENTIONS:  - Monitor percentage of each meal consumed  - Identify factors contributing to decreased intake, treat as appropriate  - Assist with meals as needed  - Monitor I&O, WT and lab values  - Obtain nutritional consult as needed  - Optimize oral hygiene and moisture  - Encourage food from home; allow for food preferences  - Enhance eating environment  Outcome: Progressing     Problem: PAIN - ADULT  Goal: Verbalizes/displays adequate comfort level or patient's stated pain goal  Description: INTERVENTIONS:  - Encourage pt to monitor pain and request assistance  - Assess pain using appropriate pain scale  - Administer analgesics based on type and severity of pain and evaluate response  - Implement non-pharmacological measures as appropriate and evaluate response  - Consider cultural and social influences on pain and pain management  - Manage/alleviate anxiety  - Utilize distraction and/or relaxation techniques  - Monitor for opioid side effects  - Notify MD/LIP if interventions unsuccessful or patient reports new pain  - Anticipate increased pain with activity and pre-medicate as appropriate  Outcome: Progressing     Problem: RISK FOR INFECTION - ADULT  Goal: Absence of fever/infection during anticipated neutropenic period  Description: INTERVENTIONS  - Monitor WBC  - Administer growth factors as ordered  - Implement neutropenic guidelines  Outcome: Progressing     Problem: SAFETY ADULT - FALL  Goal: Free from fall injury  Description: INTERVENTIONS:  - Assess pt frequently for physical needs  - Identify cognitive and physical deficits and behaviors that affect risk of falls.   - [place] fall precautions as indicated by assessment.  - Educate pt/family on patient safety including physical limitations  - Instruct pt to call for assistance with activity based on assessment  - Modify environment to reduce risk of injury  - Provide assistive devices as appropriate  - Consider OT/PT consult to assist with strengthening/mobility  - Encourage toileting schedule  Outcome: Progressing

## 2022-04-07 ENCOUNTER — PATIENT OUTREACH (OUTPATIENT)
Dept: CASE MANAGEMENT | Age: 52
End: 2022-04-07

## 2022-04-07 NOTE — PROGRESS NOTES
VM received; pt requesting assistance w/scheduling apt (dc 04/06)    [facility] [address] [phone]  Apt made: Mon 04/11 @12:45pm w/[address]i [address] [facility], [address] [place] [address] [phone]  Follow up 1 week  Apt made:   Wed 04/13 @11:00am  LVM w/apt information; will call to confirm apts  Confirmed w/pt  Closing encounter

## 2023-10-12 ENCOUNTER — APPOINTMENT (OUTPATIENT)
Dept: GENERAL RADIOLOGY | Facility: HOSPITAL | Age: 53
End: 2023-10-12
Attending: EMERGENCY MEDICINE
Payer: COMMERCIAL

## 2023-10-12 ENCOUNTER — APPOINTMENT (OUTPATIENT)
Dept: ULTRASOUND IMAGING | Facility: HOSPITAL | Age: 53
End: 2023-10-12
Attending: EMERGENCY MEDICINE
Payer: COMMERCIAL

## 2023-10-12 ENCOUNTER — HOSPITAL ENCOUNTER (EMERGENCY)
Facility: HOSPITAL | Age: 53
Discharge: HOME | End: 2023-10-12
Attending: EMERGENCY MEDICINE
Payer: COMMERCIAL

## 2023-10-12 VITALS
RESPIRATION RATE: 19 BRPM | OXYGEN SATURATION: 96 % | DIASTOLIC BLOOD PRESSURE: 77 MMHG | WEIGHT: 210 LBS | BODY MASS INDEX: 35.85 KG/M2 | TEMPERATURE: 98 F | SYSTOLIC BLOOD PRESSURE: 122 MMHG | HEIGHT: 64 IN | HEART RATE: 67 BPM

## 2023-10-12 DIAGNOSIS — M54.50 LOW BACK PAIN RADIATING TO LEFT LEG: Primary | ICD-10-CM

## 2023-10-12 DIAGNOSIS — M79.605 LOW BACK PAIN RADIATING TO LEFT LEG: Primary | ICD-10-CM

## 2023-10-12 PROCEDURE — 72110 X-RAY EXAM L-2 SPINE 4/>VWS: CPT | Performed by: EMERGENCY MEDICINE

## 2023-10-12 PROCEDURE — 96372 THER/PROPH/DIAG INJ SC/IM: CPT

## 2023-10-12 PROCEDURE — 99285 EMERGENCY DEPT VISIT HI MDM: CPT

## 2023-10-12 PROCEDURE — 93971 EXTREMITY STUDY: CPT | Performed by: EMERGENCY MEDICINE

## 2023-10-12 PROCEDURE — 99284 EMERGENCY DEPT VISIT MOD MDM: CPT

## 2023-10-12 RX ORDER — PREDNISONE 20 MG/1
60 TABLET ORAL ONCE
Status: COMPLETED | OUTPATIENT
Start: 2023-10-12 | End: 2023-10-12

## 2023-10-12 RX ORDER — KETOROLAC TROMETHAMINE 15 MG/ML
30 INJECTION, SOLUTION INTRAMUSCULAR; INTRAVENOUS ONCE
Status: COMPLETED | OUTPATIENT
Start: 2023-10-12 | End: 2023-10-12

## 2023-10-12 RX ORDER — NAPROXEN 500 MG/1
500 TABLET ORAL 2 TIMES DAILY PRN
Qty: 20 TABLET | Refills: 0 | Status: SHIPPED | OUTPATIENT
Start: 2023-10-12 | End: 2023-10-22

## 2023-10-12 RX ORDER — METHYLPREDNISOLONE 4 MG/1
TABLET ORAL
Qty: 1 EACH | Refills: 0 | Status: SHIPPED | OUTPATIENT
Start: 2023-10-12 | End: 2023-10-18 | Stop reason: ALTCHOICE

## 2023-10-12 RX ORDER — HYDROMORPHONE HYDROCHLORIDE 1 MG/ML
1 INJECTION, SOLUTION INTRAMUSCULAR; INTRAVENOUS; SUBCUTANEOUS ONCE
Status: COMPLETED | OUTPATIENT
Start: 2023-10-12 | End: 2023-10-12

## 2023-10-12 RX ORDER — SULFASALAZINE 500 MG/1
1000 TABLET, DELAYED RELEASE ORAL 2 TIMES DAILY
COMMUNITY
Start: 2023-09-19

## 2023-10-12 RX ORDER — HYDROMORPHONE HYDROCHLORIDE 1 MG/ML
0.5 INJECTION, SOLUTION INTRAMUSCULAR; INTRAVENOUS; SUBCUTANEOUS ONCE
Status: DISCONTINUED | OUTPATIENT
Start: 2023-10-12 | End: 2023-10-12

## 2023-10-12 RX ORDER — HYDROCODONE BITARTRATE AND ACETAMINOPHEN 5; 325 MG/1; MG/1
1 TABLET ORAL ONCE
Status: COMPLETED | OUTPATIENT
Start: 2023-10-12 | End: 2023-10-12

## 2023-10-12 RX ORDER — HYDROCODONE BITARTRATE AND ACETAMINOPHEN 5; 325 MG/1; MG/1
1 TABLET ORAL EVERY 6 HOURS PRN
Qty: 10 TABLET | Refills: 0 | Status: SHIPPED | OUTPATIENT
Start: 2023-10-12 | End: 2023-10-17

## 2023-10-12 NOTE — ED INITIAL ASSESSMENT (HPI)
Patient here with c/o chronic left knee pain that got worse last night to the point where she could not sleep. She also reports the pain feels different. Patient had recent MRI and was told it was fine. Denies injury.

## 2023-10-14 ENCOUNTER — APPOINTMENT (OUTPATIENT)
Dept: GENERAL RADIOLOGY | Facility: HOSPITAL | Age: 53
End: 2023-10-14
Attending: EMERGENCY MEDICINE
Payer: COMMERCIAL

## 2023-10-14 ENCOUNTER — HOSPITAL ENCOUNTER (EMERGENCY)
Facility: HOSPITAL | Age: 53
Discharge: HOME | End: 2023-10-14
Attending: EMERGENCY MEDICINE
Payer: COMMERCIAL

## 2023-10-14 VITALS
RESPIRATION RATE: 20 BRPM | OXYGEN SATURATION: 97 % | WEIGHT: 210 LBS | DIASTOLIC BLOOD PRESSURE: 47 MMHG | HEART RATE: 76 BPM | HEIGHT: 64 IN | TEMPERATURE: 98 F | BODY MASS INDEX: 35.85 KG/M2 | SYSTOLIC BLOOD PRESSURE: 127 MMHG

## 2023-10-14 DIAGNOSIS — M17.10 ARTHRITIS OF KNEE: Primary | ICD-10-CM

## 2023-10-14 DIAGNOSIS — M24.00 INTRA-ARTICULAR LOOSE BODY: ICD-10-CM

## 2023-10-14 PROCEDURE — 99283 EMERGENCY DEPT VISIT LOW MDM: CPT

## 2023-10-14 PROCEDURE — 96372 THER/PROPH/DIAG INJ SC/IM: CPT

## 2023-10-14 PROCEDURE — 99284 EMERGENCY DEPT VISIT MOD MDM: CPT

## 2023-10-14 PROCEDURE — 73562 X-RAY EXAM OF KNEE 3: CPT | Performed by: EMERGENCY MEDICINE

## 2023-10-14 RX ORDER — HYDROMORPHONE HYDROCHLORIDE 1 MG/ML
1 INJECTION, SOLUTION INTRAMUSCULAR; INTRAVENOUS; SUBCUTANEOUS ONCE
Status: COMPLETED | OUTPATIENT
Start: 2023-10-14 | End: 2023-10-14

## 2023-10-14 NOTE — ED INITIAL ASSESSMENT (HPI)
Pt arrives to ED with \"severe\" in her left knee, pt states she was here a few days ago and told she had sciatica. Pt states the knee pain is getting worse, pt denies injury to the area, pt states she does have arthritis.

## 2023-10-16 ENCOUNTER — HOSPITAL ENCOUNTER (EMERGENCY)
Facility: HOSPITAL | Age: 53
Discharge: HOME | End: 2023-10-16
Attending: EMERGENCY MEDICINE
Payer: COMMERCIAL

## 2023-10-16 VITALS
BODY MASS INDEX: 35.85 KG/M2 | WEIGHT: 210 LBS | HEIGHT: 64 IN | OXYGEN SATURATION: 97 % | RESPIRATION RATE: 16 BRPM | HEART RATE: 81 BPM | SYSTOLIC BLOOD PRESSURE: 121 MMHG | DIASTOLIC BLOOD PRESSURE: 75 MMHG | TEMPERATURE: 98 F

## 2023-10-16 DIAGNOSIS — M17.12 OSTEOARTHRITIS OF LEFT KNEE, UNSPECIFIED OSTEOARTHRITIS TYPE: Primary | ICD-10-CM

## 2023-10-16 PROCEDURE — 96375 TX/PRO/DX INJ NEW DRUG ADDON: CPT

## 2023-10-16 PROCEDURE — 96374 THER/PROPH/DIAG INJ IV PUSH: CPT

## 2023-10-16 PROCEDURE — 99284 EMERGENCY DEPT VISIT MOD MDM: CPT

## 2023-10-16 RX ORDER — HYDROCODONE BITARTRATE AND ACETAMINOPHEN 7.5; 325 MG/1; MG/1
1-2 TABLET ORAL EVERY 6 HOURS PRN
Qty: 10 TABLET | Refills: 0 | Status: SHIPPED | OUTPATIENT
Start: 2023-10-16 | End: 2023-10-21

## 2023-10-16 RX ORDER — LORAZEPAM 2 MG/ML
1 INJECTION INTRAMUSCULAR ONCE
Status: COMPLETED | OUTPATIENT
Start: 2023-10-16 | End: 2023-10-16

## 2023-10-16 RX ORDER — HYDROMORPHONE HYDROCHLORIDE 1 MG/ML
1 INJECTION, SOLUTION INTRAMUSCULAR; INTRAVENOUS; SUBCUTANEOUS EVERY 30 MIN PRN
Status: DISCONTINUED | OUTPATIENT
Start: 2023-10-16 | End: 2023-10-16

## 2023-10-16 RX ORDER — KETOROLAC TROMETHAMINE 30 MG/ML
15 INJECTION, SOLUTION INTRAMUSCULAR; INTRAVENOUS ONCE
Status: COMPLETED | OUTPATIENT
Start: 2023-10-16 | End: 2023-10-16

## 2023-10-16 NOTE — ED INITIAL ASSESSMENT (HPI)
This is patients 3 rd visit for L knee pain, taking steroid, naproxen and norco with no relief, no injuries

## 2025-06-15 NOTE — ED QUICK NOTES
Provider at bedside with pt to address pain concerns. Pt tearful and frustrated with experienced pain. Provider offered ativan to help pt with anxiety. Pt accepted. No difficulties/Difficulty making decisions

## (undated) DEVICE — Device: Brand: DEFENDO AIR/WATER/SUCTION AND BIOPSY VALVE

## (undated) DEVICE — DISPOSABLE BIPOLAR FORCEPS 4" (10.2CM) JEWELERS, STRAIGHT 0.4MM TIP AND 12 FT. (3.6M) CABLE: Brand: KIRWAN

## (undated) DEVICE — 3M(TM) TEGADERM(TM) TRANSPARENT FILM DRESSING FRAME STYLE 9505W: Brand: 3M™ TEGADERM™

## (undated) DEVICE — 1200CC GUARDIAN II: Brand: GUARDIAN

## (undated) DEVICE — ENDOSCOPY PACK - LOWER: Brand: MEDLINE INDUSTRIES, INC.

## (undated) DEVICE — KENDALL SCD EXPRESS SLEEVES, KNEE LENGTH, MEDIUM: Brand: KENDALL SCD

## (undated) DEVICE — STERILE SYNTHETIC POLYISOPRENE POWDER-FREE SURGICAL GLOVES WITH HYDROGEL COATING: Brand: PROTEXIS

## (undated) DEVICE — FILTERLINE NASAL ADULT O2/CO2

## (undated) DEVICE — ENDOPATH XCEL WITH OPTIVIEW TECHNOLOGY BLADELESS TROCARS WITH STABILITY SLEEVES: Brand: ENDOPATH XCEL OPTIVIEW

## (undated) DEVICE — 3M™ TEGADERM™ TRANSPARENT FILM DRESSING, 1626W, 4 IN X 4-3/4 IN (10 CM X 12 CM), 50 EACH/CARTON, 4 CARTON/CASE: Brand: 3M™ TEGADERM™

## (undated) DEVICE — ENDO POUCH

## (undated) DEVICE — REM POLYHESIVE ADULT PATIENT RETURN ELECTRODE: Brand: VALLEYLAB

## (undated) DEVICE — FORCEP BIOPSY RJ4 LG CAP W/ND

## (undated) DEVICE — STANDARD HYPODERMIC NEEDLE,POLYPROPYLENE HUB: Brand: MONOJECT

## (undated) DEVICE — ENDOPATH ECHELON ENDOSCOPIC LINEAR CUTTER RELOADS, BLUE, 60MM: Brand: ECHELON ENDOPATH

## (undated) DEVICE — GLOVE ORTHO ALOETOUCH SZ 8-1/2

## (undated) DEVICE — GAUZE SPONGES,USP TYPE VII GAUZE, 12 PLY: Brand: CURITY

## (undated) DEVICE — SUTURE ETHILON 4-0 P-3

## (undated) DEVICE — 3M™ RED DOT™ MONITORING ELECTRODE WITH FOAM TAPE AND STICKY GEL, 50/BAG, 20/CASE, 72/PLT 2570: Brand: RED DOT™

## (undated) DEVICE — DISSECTOR SONICISION CORDLESS

## (undated) DEVICE — VIOLET BRAIDED (POLYGLACTIN 910), SYNTHETIC ABSORBABLE SUTURE: Brand: COATED VICRYL

## (undated) DEVICE — ENDOPATH XCEL BLUNT TIP TROCARS WITH SMOOTH SLEEVES: Brand: ENDOPATH XCEL

## (undated) DEVICE — UNDYED BRAIDED (POLYGLACTIN 910), SYNTHETIC ABSORBABLE SUTURE: Brand: COATED VICRYL

## (undated) DEVICE — SOL  .9 1000ML BTL

## (undated) DEVICE — ENDOPATH XCEL UNIVERSAL TROCAR STABLILITY SLEEVES: Brand: ENDOPATH XCEL

## (undated) DEVICE — MEDI-VAC NON-CONDUCTIVE SUCTION TUBING: Brand: CARDINAL HEALTH

## (undated) DEVICE — STERILE POLYISOPRENE POWDER-FREE SURGICAL GLOVES: Brand: PROTEXIS

## (undated) DEVICE — CONVERTORS STOCKINETTE: Brand: CONVERTORS

## (undated) DEVICE — ECHELON FLEX POWERED PLUS ARTICULATING ENDOSCOPIC LINEAR CUTTER , 60MM: Brand: ECHELON FLEX

## (undated) DEVICE — UPPER EXTREMITY CDS-LF: Brand: MEDLINE INDUSTRIES, INC.

## (undated) DEVICE — MEDI-VAC SUCTION HANDLE REGULAR CAPACITY: Brand: CARDINAL HEALTH

## (undated) DEVICE — GOWN SURG AERO CHROME XXL

## (undated) DEVICE — OCCLUSIVE GAUZE STRIP OVERWRAP,3% BISMUTH TRIBROMOPHENATE IN PETROLATUM BLEND: Brand: XEROFORM

## (undated) DEVICE — CHLORAPREP 26ML APPLICATOR

## (undated) DEVICE — LAP CHOLE/APPY CDS-LF: Brand: MEDLINE INDUSTRIES, INC.

## (undated) NOTE — ED AVS SNAPSHOT
Naseem Garrido   MRN: [id number]    Department:  [facility] Emergency Department   Date of Visit:  12/20/2018           Disclosure     Insurance plans vary and the physician(s) referred by the ER may not be covered by your plan.  Please contact you tell this physician (or your personal doctor if your instructions are to return to your personal doctor) about any new or lasting problems. The primary care or specialist physician will see patients referred from the [facility] Emergency Department.  Salvadore Skiff

## (undated) NOTE — LETTER
[facility]  Raciel Anselmoana [id number] [facility], [address] [place] [address]    Consent for Operation    Date: __________________    Time: _______________    1.  I authorize the performance upon Michelle Downing the following operation:    Procedure(s):  Laparoscopic Appendec procedure has been videotaped, the surgeon will obtain the original videotape. The hospital will not be responsible for storage or maintenance of this tape.     6. For the purpose of advancing medical education, I consent to the admittance of observers to t STATEMENTS REQUIRING INSERTION OR COMPLETION WERE FILLED IN.     Signature of Patient:   ___________________________    When the patient is a minor or mentally incompetent to give consent:  Signature of person authorized to consent for patient: ____________ drugs/illegal medications). Failure to inform my anesthesiologist about these medicines may increase my risk of anesthetic complications. · If I am allergic to anything or have had a reaction to anesthesia before.     3. I understand how the anesthesia med I have discussed the procedure and information above with the patient (or patient’s representative) and answered their questions. The patient or their representative has agreed to have anesthesia services.     _______________________________________________

## (undated) NOTE — IP AVS SNAPSHOT
[facility] [place] [address] [place], [address] ~ [phone]                Discharge Summary   1/3/2017    Colby Arias           Admission Information        Provider Department    1/3/2017 Dia Gorman MD  [id number]         [place] Commonly known as:  FLONASE        2 sprays by Each Nare route daily as needed for Rhinitis.                      RHINITIS       Fluticasone Propionate 50 MCG/ACT Susp   Last time this was given:  2 sprays on 1/4/2017  9:38 AM   Commonly known as:  [place] Please  your prescriptions at the location directed by your doctor or nurse     Bring a paper prescription for each of these medications    - HYDROcodone-acetaminophen 5-325 MG Tabs            Discharge References/Attachments     LAPAROSCOPIC APPEND Recent Hematology Lab Results (cont.)  (Last 3 results in the past 90 days)    Neutrophil % Lymphocyte % Monocyte % Eosinophil % Basophil % Prelim Neut Abs Final Neut Abs Lymphocyte Abso Monocyte Absolu Eosinophil Abso Basophil Absolu    (01/04/17)  78.4 ( coverage. Patient [address] is a Federal Navigator program that can help with your Affordable Care Act coverage, as well as all types of Medicaid plans.   To get signed up and covered, please call [phone] and ask to get set up for an insuran ferrous sulfate 325 (65 FE) MG Oral Tab EC       Use: Increase blood cell counts   Most common side effects: Pain, fever, rash, fatigue, joint pain, blood clots, high blood pressure   What to report to your healthcare team: Pain, swelling, rash, fever Headache, Nausea/vomiting           Mood and Thought Medications     Sertraline HCl 100 MG Oral Tab       Use: Treat conditions such as depression and thought disorders   Most common side effects: Dizziness, drowsiness, problems with movement   What to rep

## (undated) NOTE — ED AVS SNAPSHOT
Teddy Ibanez   MRN: [id number]    Department:  [facility] Emergency Department   Date of Visit:  10/26/2019           Disclosure     Insurance plans vary and the physician(s) referred by the ER may not be covered by your plan.  Please contact you tell this physician (or your personal doctor if your instructions are to return to your personal doctor) about any new or lasting problems. The primary care or specialist physician will see patients referred from the [facility] Emergency Department.  [place]